# Patient Record
Sex: FEMALE | Race: WHITE | NOT HISPANIC OR LATINO | Employment: FULL TIME | URBAN - METROPOLITAN AREA
[De-identification: names, ages, dates, MRNs, and addresses within clinical notes are randomized per-mention and may not be internally consistent; named-entity substitution may affect disease eponyms.]

---

## 2019-01-15 ENCOUNTER — OFFICE VISIT CONVERTED (OUTPATIENT)
Dept: NEUROSURGERY | Facility: CLINIC | Age: 44
End: 2019-01-15
Attending: PHYSICIAN ASSISTANT

## 2019-01-23 ENCOUNTER — HOSPITAL ENCOUNTER (OUTPATIENT)
Dept: OTHER | Facility: HOSPITAL | Age: 44
Setting detail: RECURRING SERIES
Discharge: HOME | End: 2019-02-27

## 2019-01-23 ENCOUNTER — HOSPITAL ENCOUNTER (OUTPATIENT)
Dept: MRI IMAGING | Facility: HOSPITAL | Age: 44
Discharge: HOME | End: 2019-01-23
Attending: PHYSICIAN ASSISTANT

## 2019-01-31 ENCOUNTER — OFFICE VISIT CONVERTED (OUTPATIENT)
Dept: NEUROSURGERY | Facility: CLINIC | Age: 44
End: 2019-01-31
Attending: PHYSICIAN ASSISTANT

## 2019-02-08 ENCOUNTER — HOSPITAL ENCOUNTER (OUTPATIENT)
Dept: GENERAL RADIOLOGY | Facility: HOSPITAL | Age: 44
Discharge: HOME | End: 2019-02-08
Attending: PHYSICIAN ASSISTANT

## 2019-02-12 ENCOUNTER — OFFICE VISIT CONVERTED (OUTPATIENT)
Dept: FAMILY MEDICINE CLINIC | Facility: CLINIC | Age: 44
End: 2019-02-12
Attending: NURSE PRACTITIONER

## 2019-02-19 ENCOUNTER — OFFICE VISIT CONVERTED (OUTPATIENT)
Dept: NEUROSURGERY | Facility: CLINIC | Age: 44
End: 2019-02-19
Attending: PHYSICIAN ASSISTANT

## 2019-02-25 ENCOUNTER — OFFICE VISIT CONVERTED (OUTPATIENT)
Dept: FAMILY MEDICINE CLINIC | Facility: CLINIC | Age: 44
End: 2019-02-25
Attending: NURSE PRACTITIONER

## 2019-02-25 ENCOUNTER — HOSPITAL ENCOUNTER (OUTPATIENT)
Dept: FAMILY MEDICINE CLINIC | Facility: CLINIC | Age: 44
Discharge: HOME | End: 2019-02-25
Attending: NURSE PRACTITIONER

## 2019-02-25 ENCOUNTER — CONVERSION ENCOUNTER (OUTPATIENT)
Dept: FAMILY MEDICINE CLINIC | Facility: CLINIC | Age: 44
End: 2019-02-25

## 2019-02-25 LAB
ALBUMIN SERPL-MCNC: 4.3 G/DL (ref 3.5–5)
ALBUMIN/GLOB SERPL: 1.1 {RATIO} (ref 1.4–2.6)
ALP SERPL-CCNC: 97 U/L (ref 42–98)
ALT SERPL-CCNC: 18 U/L (ref 10–40)
ANION GAP SERPL CALC-SCNC: 20 MMOL/L (ref 8–19)
AST SERPL-CCNC: 19 U/L (ref 15–50)
BASOPHILS # BLD AUTO: 0.07 10*3/UL (ref 0–0.2)
BASOPHILS NFR BLD AUTO: 0.6 % (ref 0–3)
BILIRUB SERPL-MCNC: 0.22 MG/DL (ref 0.2–1.3)
BUN SERPL-MCNC: 17 MG/DL (ref 5–25)
BUN/CREAT SERPL: 20 {RATIO} (ref 6–20)
CALCIUM SERPL-MCNC: 9.4 MG/DL (ref 8.7–10.4)
CHLORIDE SERPL-SCNC: 104 MMOL/L (ref 99–111)
CHOLEST SERPL-MCNC: 176 MG/DL (ref 107–200)
CHOLEST/HDLC SERPL: 5.7 {RATIO} (ref 3–6)
CONV ABS IMM GRAN: 0.04 10*3/UL (ref 0–0.2)
CONV CO2: 19 MMOL/L (ref 22–32)
CONV CREATININE URINE, RANDOM: 393 MG/DL (ref 10–300)
CONV IMMATURE GRAN: 0.3 % (ref 0–1.8)
CONV MICROALBUM.,U,RANDOM: 21 MG/L (ref 0–20)
CONV TOTAL PROTEIN: 8.2 G/DL (ref 6.3–8.2)
CREAT UR-MCNC: 0.83 MG/DL (ref 0.5–0.9)
DEPRECATED RDW RBC AUTO: 44.5 FL (ref 36.4–46.3)
EOSINOPHIL # BLD AUTO: 0.4 10*3/UL (ref 0–0.7)
EOSINOPHIL # BLD AUTO: 3.1 % (ref 0–7)
ERYTHROCYTE [DISTWIDTH] IN BLOOD BY AUTOMATED COUNT: 12.8 % (ref 11.7–14.4)
EST. AVERAGE GLUCOSE BLD GHB EST-MCNC: 114 MG/DL
GFR SERPLBLD BASED ON 1.73 SQ M-ARVRAT: >60 ML/MIN/{1.73_M2}
GLOBULIN UR ELPH-MCNC: 3.9 G/DL (ref 2–3.5)
GLUCOSE SERPL-MCNC: 96 MG/DL (ref 65–99)
HBA1C MFR BLD: 14.3 G/DL (ref 12–16)
HBA1C MFR BLD: 5.6 % (ref 3.5–5.7)
HCT VFR BLD AUTO: 43.9 % (ref 37–47)
HDLC SERPL-MCNC: 31 MG/DL (ref 40–60)
LDLC SERPL CALC-MCNC: 82 MG/DL (ref 70–100)
LYMPHOCYTES # BLD AUTO: 3.65 10*3/UL (ref 1–5)
MCH RBC QN AUTO: 30.8 PG (ref 27–31)
MCHC RBC AUTO-ENTMCNC: 32.6 G/DL (ref 33–37)
MCV RBC AUTO: 94.6 FL (ref 81–99)
MICROALBUMIN/CREAT UR: 5.3 MG/G{CRE} (ref 0–35)
MONOCYTES # BLD AUTO: 0.87 10*3/UL (ref 0.2–1.2)
MONOCYTES NFR BLD AUTO: 6.8 % (ref 3–10)
NEUTROPHILS # BLD AUTO: 7.68 10*3/UL (ref 2–8)
NEUTROPHILS NFR BLD AUTO: 60.5 % (ref 30–85)
NRBC CBCN: 0 % (ref 0–0.7)
OSMOLALITY SERPL CALC.SUM OF ELEC: 289 MOSM/KG (ref 273–304)
PLATELET # BLD AUTO: 368 10*3/UL (ref 130–400)
PMV BLD AUTO: 11.3 FL (ref 9.4–12.3)
POTASSIUM SERPL-SCNC: 4.4 MMOL/L (ref 3.5–5.3)
RBC # BLD AUTO: 4.64 10*6/UL (ref 4.2–5.4)
SODIUM SERPL-SCNC: 139 MMOL/L (ref 135–147)
T4 FREE SERPL-MCNC: 1.5 NG/DL (ref 0.9–1.8)
TRIGL SERPL-MCNC: 314 MG/DL (ref 40–150)
TSH SERPL-ACNC: 1.45 M[IU]/L (ref 0.27–4.2)
VARIANT LYMPHS NFR BLD MANUAL: 28.7 % (ref 20–45)
VLDLC SERPL-MCNC: 63 MG/DL (ref 5–37)
WBC # BLD AUTO: 12.71 10*3/UL (ref 4.8–10.8)

## 2019-02-26 ENCOUNTER — HOSPITAL ENCOUNTER (OUTPATIENT)
Dept: ULTRASOUND IMAGING | Facility: HOSPITAL | Age: 44
Discharge: HOME | End: 2019-02-26
Attending: NURSE PRACTITIONER

## 2019-03-13 ENCOUNTER — OFFICE VISIT CONVERTED (OUTPATIENT)
Dept: FAMILY MEDICINE CLINIC | Facility: CLINIC | Age: 44
End: 2019-03-13
Attending: NURSE PRACTITIONER

## 2019-03-25 ENCOUNTER — OFFICE VISIT CONVERTED (OUTPATIENT)
Dept: UROLOGY | Facility: CLINIC | Age: 44
End: 2019-03-25
Attending: UROLOGY

## 2019-04-05 ENCOUNTER — CONVERSION ENCOUNTER (OUTPATIENT)
Dept: CARDIOLOGY | Facility: CLINIC | Age: 44
End: 2019-04-05

## 2019-04-12 ENCOUNTER — HOSPITAL ENCOUNTER (OUTPATIENT)
Dept: GENERAL RADIOLOGY | Facility: HOSPITAL | Age: 44
Discharge: HOME | End: 2019-04-12
Attending: UROLOGY

## 2019-04-22 ENCOUNTER — OFFICE VISIT CONVERTED (OUTPATIENT)
Dept: FAMILY MEDICINE CLINIC | Facility: CLINIC | Age: 44
End: 2019-04-22
Attending: NURSE PRACTITIONER

## 2019-05-09 ENCOUNTER — CONVERSION ENCOUNTER (OUTPATIENT)
Dept: NEUROLOGY | Facility: CLINIC | Age: 44
End: 2019-05-09

## 2019-05-09 ENCOUNTER — HOSPITAL ENCOUNTER (OUTPATIENT)
Dept: GENERAL RADIOLOGY | Facility: HOSPITAL | Age: 44
Discharge: HOME | End: 2019-05-09
Attending: NURSE PRACTITIONER

## 2019-05-09 ENCOUNTER — OFFICE VISIT CONVERTED (OUTPATIENT)
Dept: NEUROSURGERY | Facility: CLINIC | Age: 44
End: 2019-05-09
Attending: PHYSICIAN ASSISTANT

## 2019-05-10 ENCOUNTER — OFFICE VISIT CONVERTED (OUTPATIENT)
Dept: SURGERY | Facility: CLINIC | Age: 44
End: 2019-05-10
Attending: SURGERY

## 2019-05-16 ENCOUNTER — HOSPITAL ENCOUNTER (OUTPATIENT)
Dept: PERIOP | Facility: HOSPITAL | Age: 44
Setting detail: HOSPITAL OUTPATIENT SURGERY
Discharge: HOME | End: 2019-05-16
Attending: SURGERY

## 2019-05-16 LAB — HCG UR QL: NEGATIVE

## 2019-05-22 ENCOUNTER — CONVERSION ENCOUNTER (OUTPATIENT)
Dept: OTOLARYNGOLOGY | Facility: CLINIC | Age: 44
End: 2019-05-22

## 2019-05-22 ENCOUNTER — OFFICE VISIT CONVERTED (OUTPATIENT)
Dept: OTOLARYNGOLOGY | Facility: CLINIC | Age: 44
End: 2019-05-22
Attending: OTOLARYNGOLOGY

## 2019-05-28 ENCOUNTER — CONVERSION ENCOUNTER (OUTPATIENT)
Dept: CARDIOLOGY | Facility: CLINIC | Age: 44
End: 2019-05-28
Attending: SPECIALIST

## 2019-05-31 ENCOUNTER — OFFICE VISIT CONVERTED (OUTPATIENT)
Dept: SURGERY | Facility: CLINIC | Age: 44
End: 2019-05-31
Attending: SURGERY

## 2019-06-17 ENCOUNTER — HOSPITAL ENCOUNTER (OUTPATIENT)
Dept: FAMILY MEDICINE CLINIC | Facility: CLINIC | Age: 44
Discharge: HOME | End: 2019-06-17
Attending: NURSE PRACTITIONER

## 2019-06-17 ENCOUNTER — OFFICE VISIT CONVERTED (OUTPATIENT)
Dept: FAMILY MEDICINE CLINIC | Facility: CLINIC | Age: 44
End: 2019-06-17
Attending: NURSE PRACTITIONER

## 2019-06-17 LAB
ALBUMIN SERPL-MCNC: 4.1 G/DL (ref 3.5–5)
ALBUMIN/GLOB SERPL: 1.2 {RATIO} (ref 1.4–2.6)
ALP SERPL-CCNC: 101 U/L (ref 42–98)
ALT SERPL-CCNC: 10 U/L (ref 10–40)
ANION GAP SERPL CALC-SCNC: 18 MMOL/L (ref 8–19)
AST SERPL-CCNC: 12 U/L (ref 15–50)
BILIRUB SERPL-MCNC: 0.29 MG/DL (ref 0.2–1.3)
BUN SERPL-MCNC: 15 MG/DL (ref 5–25)
BUN/CREAT SERPL: 22 {RATIO} (ref 6–20)
CALCIUM SERPL-MCNC: 9.1 MG/DL (ref 8.7–10.4)
CHLORIDE SERPL-SCNC: 106 MMOL/L (ref 99–111)
CHOLEST SERPL-MCNC: 149 MG/DL (ref 107–200)
CHOLEST/HDLC SERPL: 5.1 {RATIO} (ref 3–6)
CONV CO2: 19 MMOL/L (ref 22–32)
CONV TOTAL PROTEIN: 7.6 G/DL (ref 6.3–8.2)
CREAT UR-MCNC: 0.69 MG/DL (ref 0.5–0.9)
EST. AVERAGE GLUCOSE BLD GHB EST-MCNC: 111 MG/DL
GFR SERPLBLD BASED ON 1.73 SQ M-ARVRAT: >60 ML/MIN/{1.73_M2}
GLOBULIN UR ELPH-MCNC: 3.5 G/DL (ref 2–3.5)
GLUCOSE SERPL-MCNC: 95 MG/DL (ref 65–99)
HBA1C MFR BLD: 5.5 % (ref 3.5–5.7)
HDLC SERPL-MCNC: 29 MG/DL (ref 40–60)
LDLC SERPL CALC-MCNC: 84 MG/DL (ref 70–100)
OSMOLALITY SERPL CALC.SUM OF ELEC: 289 MOSM/KG (ref 273–304)
POTASSIUM SERPL-SCNC: 4 MMOL/L (ref 3.5–5.3)
SODIUM SERPL-SCNC: 139 MMOL/L (ref 135–147)
TRIGL SERPL-MCNC: 182 MG/DL (ref 40–150)
VLDLC SERPL-MCNC: 36 MG/DL (ref 5–37)

## 2019-09-11 ENCOUNTER — OFFICE VISIT CONVERTED (OUTPATIENT)
Dept: FAMILY MEDICINE CLINIC | Facility: CLINIC | Age: 44
End: 2019-09-11
Attending: FAMILY MEDICINE

## 2019-09-17 ENCOUNTER — OFFICE VISIT CONVERTED (OUTPATIENT)
Dept: FAMILY MEDICINE CLINIC | Facility: CLINIC | Age: 44
End: 2019-09-17
Attending: NURSE PRACTITIONER

## 2019-09-17 ENCOUNTER — HOSPITAL ENCOUNTER (OUTPATIENT)
Dept: FAMILY MEDICINE CLINIC | Facility: CLINIC | Age: 44
Discharge: HOME | End: 2019-09-17
Attending: NURSE PRACTITIONER

## 2019-09-19 LAB — BACTERIA UR CULT: NORMAL

## 2020-03-23 ENCOUNTER — HOSPITAL ENCOUNTER (OUTPATIENT)
Dept: FAMILY MEDICINE CLINIC | Facility: CLINIC | Age: 45
Discharge: HOME | End: 2020-03-23
Attending: NURSE PRACTITIONER

## 2020-03-23 ENCOUNTER — OFFICE VISIT CONVERTED (OUTPATIENT)
Dept: FAMILY MEDICINE CLINIC | Facility: CLINIC | Age: 45
End: 2020-03-23
Attending: NURSE PRACTITIONER

## 2020-03-23 LAB
ALBUMIN SERPL-MCNC: 4.1 G/DL (ref 3.5–5)
ALBUMIN/GLOB SERPL: 1.3 {RATIO} (ref 1.4–2.6)
ALP SERPL-CCNC: 113 U/L (ref 42–98)
ALT SERPL-CCNC: 25 U/L (ref 10–40)
ANION GAP SERPL CALC-SCNC: 18 MMOL/L (ref 8–19)
AST SERPL-CCNC: 22 U/L (ref 15–50)
BASOPHILS # BLD AUTO: 0.06 10*3/UL (ref 0–0.2)
BASOPHILS NFR BLD AUTO: 0.5 % (ref 0–3)
BILIRUB SERPL-MCNC: <0.15 MG/DL (ref 0.2–1.3)
BUN SERPL-MCNC: 17 MG/DL (ref 5–25)
BUN/CREAT SERPL: 24 {RATIO} (ref 6–20)
CALCIUM SERPL-MCNC: 9 MG/DL (ref 8.7–10.4)
CHLORIDE SERPL-SCNC: 103 MMOL/L (ref 99–111)
CHOLEST SERPL-MCNC: 133 MG/DL (ref 107–200)
CHOLEST/HDLC SERPL: 3.7 {RATIO} (ref 3–6)
CONV ABS IMM GRAN: 0.05 10*3/UL (ref 0–0.2)
CONV CO2: 22 MMOL/L (ref 22–32)
CONV IMMATURE GRAN: 0.4 % (ref 0–1.8)
CONV TOTAL PROTEIN: 7.3 G/DL (ref 6.3–8.2)
CREAT UR-MCNC: 0.7 MG/DL (ref 0.5–0.9)
DEPRECATED RDW RBC AUTO: 49.4 FL (ref 36.4–46.3)
EOSINOPHIL # BLD AUTO: 0.15 10*3/UL (ref 0–0.7)
EOSINOPHIL # BLD AUTO: 1.3 % (ref 0–7)
ERYTHROCYTE [DISTWIDTH] IN BLOOD BY AUTOMATED COUNT: 13.6 % (ref 11.7–14.4)
EST. AVERAGE GLUCOSE BLD GHB EST-MCNC: 126 MG/DL
GFR SERPLBLD BASED ON 1.73 SQ M-ARVRAT: >60 ML/MIN/{1.73_M2}
GLOBULIN UR ELPH-MCNC: 3.2 G/DL (ref 2–3.5)
GLUCOSE SERPL-MCNC: 97 MG/DL (ref 65–99)
HBA1C MFR BLD: 6 % (ref 3.5–5.7)
HCT VFR BLD AUTO: 43.5 % (ref 37–47)
HDLC SERPL-MCNC: 36 MG/DL (ref 40–60)
HGB BLD-MCNC: 13.8 G/DL (ref 12–16)
LDLC SERPL CALC-MCNC: 76 MG/DL (ref 70–100)
LYMPHOCYTES # BLD AUTO: 2.53 10*3/UL (ref 1–5)
LYMPHOCYTES NFR BLD AUTO: 22.7 % (ref 20–45)
MCH RBC QN AUTO: 31.2 PG (ref 27–31)
MCHC RBC AUTO-ENTMCNC: 31.7 G/DL (ref 33–37)
MCV RBC AUTO: 98.2 FL (ref 81–99)
MONOCYTES # BLD AUTO: 0.67 10*3/UL (ref 0.2–1.2)
MONOCYTES NFR BLD AUTO: 6 % (ref 3–10)
NEUTROPHILS # BLD AUTO: 7.69 10*3/UL (ref 2–8)
NEUTROPHILS NFR BLD AUTO: 69.1 % (ref 30–85)
NRBC CBCN: 0 % (ref 0–0.7)
OSMOLALITY SERPL CALC.SUM OF ELEC: 289 MOSM/KG (ref 273–304)
PLATELET # BLD AUTO: 319 10*3/UL (ref 130–400)
PMV BLD AUTO: 11.3 FL (ref 9.4–12.3)
POTASSIUM SERPL-SCNC: 3.7 MMOL/L (ref 3.5–5.3)
RBC # BLD AUTO: 4.43 10*6/UL (ref 4.2–5.4)
SODIUM SERPL-SCNC: 139 MMOL/L (ref 135–147)
TRIGL SERPL-MCNC: 103 MG/DL (ref 40–150)
VLDLC SERPL-MCNC: 21 MG/DL (ref 5–37)
WBC # BLD AUTO: 11.15 10*3/UL (ref 4.8–10.8)

## 2020-06-10 ENCOUNTER — HOSPITAL ENCOUNTER (OUTPATIENT)
Dept: FAMILY MEDICINE CLINIC | Facility: CLINIC | Age: 45
Discharge: HOME | End: 2020-06-10
Attending: NURSE PRACTITIONER

## 2020-06-10 ENCOUNTER — OFFICE VISIT CONVERTED (OUTPATIENT)
Dept: FAMILY MEDICINE CLINIC | Facility: CLINIC | Age: 45
End: 2020-06-10
Attending: NURSE PRACTITIONER

## 2020-06-12 LAB
BACTERIA SPEC AEROBE CULT: ABNORMAL
BACTERIA SPEC AEROBE CULT: ABNORMAL
CIPROFLOXACIN SUSC ISLT: >=8
CIPROFLOXACIN SUSC ISLT: >=8
CLINDAMYCIN SUSC ISLT: 0.25
CLINDAMYCIN SUSC ISLT: 0.25
DAPTOMYCIN SUSC ISLT: 0.5
DAPTOMYCIN SUSC ISLT: 1
DOXYCYCLINE SUSC ISLT: <=0.5
DOXYCYCLINE SUSC ISLT: <=0.5
ERYTHROMYCIN SUSC ISLT: >=8
ERYTHROMYCIN SUSC ISLT: >=8
GENTAMICIN SUSC ISLT: <=0.5
GENTAMICIN SUSC ISLT: <=0.5
LEVOFLOXACIN SUSC ISLT: 4
LEVOFLOXACIN SUSC ISLT: 4
OXACILLIN SUSC ISLT: 0.5
OXACILLIN SUSC ISLT: 0.5
RIFAMPIN SUSC ISLT: <=0.5
RIFAMPIN SUSC ISLT: <=0.5
TETRACYCLINE SUSC ISLT: <=1
TETRACYCLINE SUSC ISLT: <=1
TIGECYCLINE SUSC ISLT: <=0.12
TIGECYCLINE SUSC ISLT: <=0.12
TMP SMX SUSC ISLT: <=10
TMP SMX SUSC ISLT: <=10
VANCOMYCIN SUSC ISLT: 1
VANCOMYCIN SUSC ISLT: 1

## 2020-06-26 ENCOUNTER — OFFICE VISIT CONVERTED (OUTPATIENT)
Dept: FAMILY MEDICINE CLINIC | Facility: CLINIC | Age: 45
End: 2020-06-26
Attending: NURSE PRACTITIONER

## 2020-06-26 ENCOUNTER — OFFICE VISIT CONVERTED (OUTPATIENT)
Dept: SURGERY | Facility: CLINIC | Age: 45
End: 2020-06-26
Attending: SURGERY

## 2020-06-26 ENCOUNTER — HOSPITAL ENCOUNTER (OUTPATIENT)
Dept: FAMILY MEDICINE CLINIC | Facility: CLINIC | Age: 45
Discharge: HOME | End: 2020-06-26
Attending: NURSE PRACTITIONER

## 2020-06-26 LAB
ALBUMIN SERPL-MCNC: 4.2 G/DL (ref 3.5–5)
ALBUMIN/GLOB SERPL: 1.3 {RATIO} (ref 1.4–2.6)
ALP SERPL-CCNC: 125 U/L (ref 42–98)
ALT SERPL-CCNC: 20 U/L (ref 10–40)
ANION GAP SERPL CALC-SCNC: 15 MMOL/L (ref 8–19)
AST SERPL-CCNC: 22 U/L (ref 15–50)
BASOPHILS # BLD AUTO: 0.05 10*3/UL (ref 0–0.2)
BASOPHILS NFR BLD AUTO: 0.4 % (ref 0–3)
BILIRUB SERPL-MCNC: 0.17 MG/DL (ref 0.2–1.3)
BUN SERPL-MCNC: 18 MG/DL (ref 5–25)
BUN/CREAT SERPL: 25 {RATIO} (ref 6–20)
CALCIUM SERPL-MCNC: 9.4 MG/DL (ref 8.7–10.4)
CHLORIDE SERPL-SCNC: 105 MMOL/L (ref 99–111)
CONV ABS IMM GRAN: 0.04 10*3/UL (ref 0–0.2)
CONV CO2: 20 MMOL/L (ref 22–32)
CONV IMMATURE GRAN: 0.3 % (ref 0–1.8)
CONV TOTAL PROTEIN: 7.4 G/DL (ref 6.3–8.2)
CREAT UR-MCNC: 0.71 MG/DL (ref 0.5–0.9)
DEPRECATED RDW RBC AUTO: 46.6 FL (ref 36.4–46.3)
EOSINOPHIL # BLD AUTO: 0.32 10*3/UL (ref 0–0.7)
EOSINOPHIL # BLD AUTO: 2.6 % (ref 0–7)
ERYTHROCYTE [DISTWIDTH] IN BLOOD BY AUTOMATED COUNT: 13.1 % (ref 11.7–14.4)
EST. AVERAGE GLUCOSE BLD GHB EST-MCNC: 131 MG/DL
GFR SERPLBLD BASED ON 1.73 SQ M-ARVRAT: >60 ML/MIN/{1.73_M2}
GLOBULIN UR ELPH-MCNC: 3.2 G/DL (ref 2–3.5)
GLUCOSE SERPL-MCNC: 126 MG/DL (ref 65–99)
HBA1C MFR BLD: 6.2 % (ref 3.5–5.7)
HCT VFR BLD AUTO: 43.2 % (ref 37–47)
HGB BLD-MCNC: 13.8 G/DL (ref 12–16)
LYMPHOCYTES # BLD AUTO: 2.55 10*3/UL (ref 1–5)
LYMPHOCYTES NFR BLD AUTO: 20.5 % (ref 20–45)
MCH RBC QN AUTO: 30.6 PG (ref 27–31)
MCHC RBC AUTO-ENTMCNC: 31.9 G/DL (ref 33–37)
MCV RBC AUTO: 95.8 FL (ref 81–99)
MONOCYTES # BLD AUTO: 0.81 10*3/UL (ref 0.2–1.2)
MONOCYTES NFR BLD AUTO: 6.5 % (ref 3–10)
NEUTROPHILS # BLD AUTO: 8.64 10*3/UL (ref 2–8)
NEUTROPHILS NFR BLD AUTO: 69.7 % (ref 30–85)
NRBC CBCN: 0 % (ref 0–0.7)
OSMOLALITY SERPL CALC.SUM OF ELEC: 285 MOSM/KG (ref 273–304)
PLATELET # BLD AUTO: 277 10*3/UL (ref 130–400)
PMV BLD AUTO: 11.8 FL (ref 9.4–12.3)
POTASSIUM SERPL-SCNC: 3.9 MMOL/L (ref 3.5–5.3)
RBC # BLD AUTO: 4.51 10*6/UL (ref 4.2–5.4)
SODIUM SERPL-SCNC: 136 MMOL/L (ref 135–147)
T4 FREE SERPL-MCNC: 1.2 NG/DL (ref 0.9–1.8)
TSH SERPL-ACNC: 0.99 M[IU]/L (ref 0.27–4.2)
WBC # BLD AUTO: 12.41 10*3/UL (ref 4.8–10.8)

## 2020-08-14 ENCOUNTER — HOSPITAL ENCOUNTER (OUTPATIENT)
Dept: GENERAL RADIOLOGY | Facility: HOSPITAL | Age: 45
Discharge: HOME | End: 2020-08-14
Attending: NURSE PRACTITIONER

## 2020-09-01 ENCOUNTER — HOSPITAL ENCOUNTER (OUTPATIENT)
Dept: FAMILY MEDICINE CLINIC | Facility: CLINIC | Age: 45
Discharge: HOME | End: 2020-09-01
Attending: NURSE PRACTITIONER

## 2020-09-01 ENCOUNTER — OFFICE VISIT CONVERTED (OUTPATIENT)
Dept: FAMILY MEDICINE CLINIC | Facility: CLINIC | Age: 45
End: 2020-09-01
Attending: NURSE PRACTITIONER

## 2020-09-01 LAB
ALBUMIN SERPL-MCNC: 4 G/DL (ref 3.5–5)
ALBUMIN/GLOB SERPL: 1.3 {RATIO} (ref 1.4–2.6)
ALP SERPL-CCNC: 129 U/L (ref 42–98)
ALT SERPL-CCNC: 21 U/L (ref 10–40)
ANION GAP SERPL CALC-SCNC: 18 MMOL/L (ref 8–19)
AST SERPL-CCNC: 23 U/L (ref 15–50)
BASOPHILS # BLD AUTO: 0.05 10*3/UL (ref 0–0.2)
BASOPHILS NFR BLD AUTO: 0.4 % (ref 0–3)
BILIRUB SERPL-MCNC: 0.16 MG/DL (ref 0.2–1.3)
BUN SERPL-MCNC: 21 MG/DL (ref 5–25)
BUN/CREAT SERPL: 25 {RATIO} (ref 6–20)
CALCIUM SERPL-MCNC: 9.5 MG/DL (ref 8.7–10.4)
CHLORIDE SERPL-SCNC: 105 MMOL/L (ref 99–111)
CONV ABS IMM GRAN: 0.05 10*3/UL (ref 0–0.2)
CONV CO2: 19 MMOL/L (ref 22–32)
CONV IMMATURE GRAN: 0.4 % (ref 0–1.8)
CONV TOTAL PROTEIN: 7.2 G/DL (ref 6.3–8.2)
CREAT UR-MCNC: 0.84 MG/DL (ref 0.5–0.9)
DEPRECATED RDW RBC AUTO: 48.1 FL (ref 36.4–46.3)
EOSINOPHIL # BLD AUTO: 0.43 10*3/UL (ref 0–0.7)
EOSINOPHIL # BLD AUTO: 3.4 % (ref 0–7)
ERYTHROCYTE [DISTWIDTH] IN BLOOD BY AUTOMATED COUNT: 13.7 % (ref 11.7–14.4)
EST. AVERAGE GLUCOSE BLD GHB EST-MCNC: 120 MG/DL
GFR SERPLBLD BASED ON 1.73 SQ M-ARVRAT: >60 ML/MIN/{1.73_M2}
GLOBULIN UR ELPH-MCNC: 3.2 G/DL (ref 2–3.5)
GLUCOSE SERPL-MCNC: 119 MG/DL (ref 65–99)
HBA1C MFR BLD: 5.8 % (ref 3.5–5.7)
HCT VFR BLD AUTO: 42.2 % (ref 37–47)
HGB BLD-MCNC: 13.8 G/DL (ref 12–16)
LYMPHOCYTES # BLD AUTO: 2.73 10*3/UL (ref 1–5)
LYMPHOCYTES NFR BLD AUTO: 21.5 % (ref 20–45)
MCH RBC QN AUTO: 31.1 PG (ref 27–31)
MCHC RBC AUTO-ENTMCNC: 32.7 G/DL (ref 33–37)
MCV RBC AUTO: 95 FL (ref 81–99)
MONOCYTES # BLD AUTO: 0.83 10*3/UL (ref 0.2–1.2)
MONOCYTES NFR BLD AUTO: 6.5 % (ref 3–10)
NEUTROPHILS # BLD AUTO: 8.59 10*3/UL (ref 2–8)
NEUTROPHILS NFR BLD AUTO: 67.8 % (ref 30–85)
NRBC CBCN: 0 % (ref 0–0.7)
OSMOLALITY SERPL CALC.SUM OF ELEC: 290 MOSM/KG (ref 273–304)
PLATELET # BLD AUTO: 303 10*3/UL (ref 130–400)
PMV BLD AUTO: 11.3 FL (ref 9.4–12.3)
POTASSIUM SERPL-SCNC: 4 MMOL/L (ref 3.5–5.3)
RBC # BLD AUTO: 4.44 10*6/UL (ref 4.2–5.4)
SODIUM SERPL-SCNC: 138 MMOL/L (ref 135–147)
WBC # BLD AUTO: 12.68 10*3/UL (ref 4.8–10.8)

## 2020-09-02 LAB
FOLATE SERPL-MCNC: 6.5 NG/ML (ref 4.8–20)
T4 FREE SERPL-MCNC: 1.6 NG/DL (ref 0.9–1.8)
TSH SERPL-ACNC: 1.8 M[IU]/L (ref 0.27–4.2)
VIT B12 SERPL-MCNC: 1913 PG/ML (ref 211–911)

## 2020-12-01 ENCOUNTER — TELEMEDICINE CONVERTED (OUTPATIENT)
Dept: FAMILY MEDICINE CLINIC | Facility: CLINIC | Age: 45
End: 2020-12-01
Attending: NURSE PRACTITIONER

## 2021-01-05 ENCOUNTER — TELEMEDICINE CONVERTED (OUTPATIENT)
Dept: FAMILY MEDICINE CLINIC | Facility: CLINIC | Age: 46
End: 2021-01-05
Attending: NURSE PRACTITIONER

## 2021-04-05 ENCOUNTER — OFFICE VISIT CONVERTED (OUTPATIENT)
Dept: FAMILY MEDICINE CLINIC | Facility: CLINIC | Age: 46
End: 2021-04-05
Attending: NURSE PRACTITIONER

## 2021-04-05 ENCOUNTER — HOSPITAL ENCOUNTER (OUTPATIENT)
Dept: FAMILY MEDICINE CLINIC | Facility: CLINIC | Age: 46
Discharge: HOME | End: 2021-04-05
Attending: NURSE PRACTITIONER

## 2021-04-05 LAB
ALBUMIN SERPL-MCNC: 4.1 G/DL (ref 3.5–5)
ALBUMIN/GLOB SERPL: 1.3 {RATIO} (ref 1.4–2.6)
ALP SERPL-CCNC: 109 U/L (ref 42–98)
ALT SERPL-CCNC: 19 U/L (ref 10–40)
ANION GAP SERPL CALC-SCNC: 15 MMOL/L (ref 8–19)
AST SERPL-CCNC: 22 U/L (ref 15–50)
BASOPHILS # BLD AUTO: 0.03 10*3/UL (ref 0–0.2)
BASOPHILS NFR BLD AUTO: 0.3 % (ref 0–3)
BILIRUB SERPL-MCNC: 0.27 MG/DL (ref 0.2–1.3)
BUN SERPL-MCNC: 17 MG/DL (ref 5–25)
BUN/CREAT SERPL: 23 {RATIO} (ref 6–20)
CALCIUM SERPL-MCNC: 9.3 MG/DL (ref 8.7–10.4)
CHLORIDE SERPL-SCNC: 102 MMOL/L (ref 99–111)
CHOLEST SERPL-MCNC: 152 MG/DL (ref 107–200)
CHOLEST/HDLC SERPL: 4.8 {RATIO} (ref 3–6)
CONV ABS IMM GRAN: 0.03 10*3/UL (ref 0–0.2)
CONV CO2: 23 MMOL/L (ref 22–32)
CONV IMMATURE GRAN: 0.3 % (ref 0–1.8)
CONV TOTAL PROTEIN: 7.3 G/DL (ref 6.3–8.2)
CREAT UR-MCNC: 0.75 MG/DL (ref 0.5–0.9)
DEPRECATED RDW RBC AUTO: 48.8 FL (ref 36.4–46.3)
EOSINOPHIL # BLD AUTO: 0.29 10*3/UL (ref 0–0.7)
EOSINOPHIL # BLD AUTO: 2.9 % (ref 0–7)
ERYTHROCYTE [DISTWIDTH] IN BLOOD BY AUTOMATED COUNT: 13.4 % (ref 11.7–14.4)
EST. AVERAGE GLUCOSE BLD GHB EST-MCNC: 117 MG/DL
GFR SERPLBLD BASED ON 1.73 SQ M-ARVRAT: >60 ML/MIN/{1.73_M2}
GLOBULIN UR ELPH-MCNC: 3.2 G/DL (ref 2–3.5)
GLUCOSE SERPL-MCNC: 89 MG/DL (ref 65–99)
HBA1C MFR BLD: 5.7 % (ref 3.5–5.7)
HCT VFR BLD AUTO: 43.1 % (ref 37–47)
HDLC SERPL-MCNC: 32 MG/DL (ref 40–60)
HGB BLD-MCNC: 13.8 G/DL (ref 12–16)
LDLC SERPL CALC-MCNC: 75 MG/DL (ref 70–100)
LYMPHOCYTES # BLD AUTO: 3.1 10*3/UL (ref 1–5)
LYMPHOCYTES NFR BLD AUTO: 31.1 % (ref 20–45)
MCH RBC QN AUTO: 31.3 PG (ref 27–31)
MCHC RBC AUTO-ENTMCNC: 32 G/DL (ref 33–37)
MCV RBC AUTO: 97.7 FL (ref 81–99)
MONOCYTES # BLD AUTO: 0.77 10*3/UL (ref 0.2–1.2)
MONOCYTES NFR BLD AUTO: 7.7 % (ref 3–10)
NEUTROPHILS # BLD AUTO: 5.74 10*3/UL (ref 2–8)
NEUTROPHILS NFR BLD AUTO: 57.7 % (ref 30–85)
NRBC CBCN: 0 % (ref 0–0.7)
OSMOLALITY SERPL CALC.SUM OF ELEC: 283 MOSM/KG (ref 273–304)
PLATELET # BLD AUTO: 283 10*3/UL (ref 130–400)
PMV BLD AUTO: 11.2 FL (ref 9.4–12.3)
POTASSIUM SERPL-SCNC: 4.1 MMOL/L (ref 3.5–5.3)
RBC # BLD AUTO: 4.41 10*6/UL (ref 4.2–5.4)
SODIUM SERPL-SCNC: 136 MMOL/L (ref 135–147)
TRIGL SERPL-MCNC: 226 MG/DL (ref 40–150)
VLDLC SERPL-MCNC: 45 MG/DL (ref 5–37)
WBC # BLD AUTO: 9.96 10*3/UL (ref 4.8–10.8)

## 2021-04-21 ENCOUNTER — OFFICE VISIT CONVERTED (OUTPATIENT)
Dept: PODIATRY | Facility: CLINIC | Age: 46
End: 2021-04-21
Attending: PODIATRIST

## 2021-05-10 NOTE — H&P
History and Physical      Patient Name: Debi Schroeder   Patient ID: [id number]   Sex: Female   YOB: 1975    Primary Care Provider: Sarah RYAN   Referring Provider: Sarah RYAN    Visit Date: June 26, 2020    Provider: Saad Downey MD   Location: [facility]   Location Address: [address] [place] [address]  [id number]   Location Phone: [phone]          History Of Present Illness     Debi came in today for evaluation. She is a nice lady that we have seen in the past. She presents with a recent infection of the left axilla. Apparently, she has had trouble with right axillary abscesses in the past and developed some new areas of inflammation in the left axilla. It is better now since getting started on some antibiotics.            Past Medical History  Abdominal pain; Allergic rhinitis due to allergen; Arthritis; Bladder problem; COPD; Degenerative Disc Disease ; Depression with anxiety; Diabetes mellitus type 2, noninsulin dependent; Essential hypertension; Herniated Disc; Hidradenitis suppurativa of left axilla; HPV (human papilloma virus) infection; Hyperlipidemia; Hypertension, Benign Essential; Impaired fasting glucose; Leg pain; Leg swelling; Migraines; Neuropathy; OAB (overactive bladder); Pain management; PTSD (post-traumatic stress disorder); Spinal stenosis; Thyroid nodule         Past Surgical History  Bladder Surgery; Cholecstectomy; Kidney Surgery         Medication List  albuterol sulfate 90 mcg/actuation inhalation HFA aerosol inhaler; cetirizine 10 mg oral tablet; Detrol 1 mg oral tablet; doxycycline hyclate 100 mg oral capsule; Effexor XR 37.5 mg oral capsule,extended release 24hr; fexofenadine 180 mg oral tablet; hydroxyzine HCl 10 mg oral tablet; oxycodone 10 mg oral tablet; propranolol 40 mg oral tablet; tizanidine 4 mg oral tablet         Allergy List  PENICILLINS         Family Medical History  Family history of Arthritis; Family  "history of heart disease; Family history of diabetes mellitus         Reproductive History   0 Para 0 0 0 0       Social History  Alcohol (Never); Tobacco (Current every day); Unemployed         Immunizations  Name Date Admin   Influenza          Vitals  Date Time BP Position Site L\R Cuff Size HR RR TEMP (F) WT  HT  BMI kg/m2 BSA m2 O2 Sat HC       2020 10:11 AM       16  266lbs 0oz 5'  7\" 41.66 2.39           Physical Examination     She has no active abscesses on either side but she does have stigmata of hidradenitis in both axilla.           Assessment  · Hidradenitis suppurativa     705.83/L73.2      Plan  · Medications  o Medications have been Reconciled  o Transition of Care or Provider Policy  · Instructions  o Follow Up PRN     At this point, I told Debi there is nothing that needs to be acutely drained and actually her infection seems to be pretty well controlled right now. We did talk about potentially, at some point, considering a definitive axillary excision if her symptoms were to get worse and she indicated that she would go home and think about this. I will see her back on an as needed basis.             Electronically Signed by: Violet Pickett-, -Author on 2020 11:48:41 AM  Electronically Co-signed by: Saad Downey MD -Reviewer on 2020 07:23:51 AM  "

## 2021-05-11 NOTE — H&P
History and Physical      Patient Name: Debi Schroeder   Patient ID: [id number]   Sex: Female   YOB: 1975    Primary Care Provider: Sarah RYAN   Referring Provider: Sarah RYAN    Visit Date: April 21, 2021    Provider: Alexei Quinn DPM   Location: [facility]   Location Address: [address] [place] [address]  [id number]   Location Phone: [phone]          Chief Complaint  · Routine Foot Care Visit      History Of Present Illness  Debi Schroeder complains of painful, elongated toenails which are thickened, yellowed, chalky, and cause pain with shoe gear and ambulation.      New, Established, New Problem:  New  Location:  Toenails  Duration:  Greater than five years  Onset:  Gradual  Nature:  sore with palpation.  Stable, worsening, improving:   Worsening  Aggravating factors:  Pain with shoe gear and ambulation.  Previous Treatment:  Pt cannot trim their own toenails    Patient denies any fevers, chills, nausea, vomiting, shortness of breathe, nor any other constitutional signs nor symptoms.    Patient states that they work at [employer].    Pt states she goes to pain t for DJD in her back.       Past Medical History  Abdominal pain; Allergic rhinitis due to allergen; Arthritis; Bladder problem; COPD; Degenerative Disc Disease ; Depression with anxiety; Dysmenorrhea; Essential hypertension; Herniated Disc; Hidradenitis suppurativa; Hidradenitis suppurativa of left axilla; HPV (human papilloma virus) infection; Hyperlipidemia; Hypertension, Benign Essential; IBS (irritable bowel syndrome); Impaired fasting glucose; Ingrown toenail; Leg pain; Leg swelling; Migraines; Neuropathy; Numbness in feet; OAB (overactive bladder); Pain management; PTSD (post-traumatic stress disorder); Spinal stenosis; Thyroid nodule         Past Surgical History  Bladder Surgery; Cholecstectomy; Kidney Surgery         Medication List  albuterol sulfate 90  "mcg/actuation inhalation HFA aerosol inhaler; cetirizine 10 mg oral tablet; FEXOFENADINE 180MG TABLETS (OTC); fluticasone propionate 50 mcg/actuation nasal spray,suspension; methocarbamol 750 mg oral tablet; Myrbetriq 50 mg oral tablet extended release 24 hr; Percocet 7.5-325 mg oral tablet; propranolol 40 mg oral tablet; rizatriptan 10 mg oral tablet,disintegrating; tizanidine 4 mg oral tablet         Allergy List  oxybutynin; PENICILLINS       Allergies Reconciled  Family Medical History  Family history of Arthritis; Family history of heart disease; Family history of diabetes mellitus         Reproductive History   0 Para 0 0 0 0       Social History  Alcohol (Never); Tobacco (Current every day); Unemployed         Immunizations  Name Date Admin   Influenza 02/15/2019         Review of Systems  · Constitutional  o Denies  o : fatigue, night sweats  · Eyes  o Denies  o : double vision, blurred vision  · HENT  o Denies  o : vertigo, recent head injury  · Cardiovascular  o Denies  o : chest pain, irregular heart beats  · Respiratory  o Denies  o : shortness of breath, productive cough  · Gastrointestinal  o Denies  o : nausea, vomiting  · Genitourinary  o Denies  o : dysuria, urinary retention  · Integument  o * See HPI  · Neurologic  o Denies  o : altered mental status, seizures  · Musculoskeletal  o Denies  o : joint swelling, limitation of motion  · Endocrine  o Denies  o : cold intolerance, heat intolerance  · Heme-Lymph  o Denies  o : petechiae, lymph node enlargement or tenderness  · Allergic-Immunologic  o Denies  o : frequent illnesses      Vitals  Date Time BP Position Site L\R Cuff Size HR RR TEMP (F) WT  HT  BMI kg/m2 BSA m2 O2 Sat FR L/min FiO2        2021 02:52 /67 Sitting    83 - R  97.3 171lbs 0oz 5'  7\" 26.78 1.91 97 %            Physical Examination  · Constitutional  o Appearance  o : No acute distress, generally in good health. Awake, alert, understands questions and responds " appropriately.   · Respiratory  o Respiratory Effort  o : No labored breathing. Good respiratory effort.   · Cardiovascular  o Peripheral Vascular System  o :   § Pedal Pulses  § : Pedal Pulses are 2+ and symmetrical  § Extremities  § : There is no edema of the lower extremities  · Musculoskeletal  o Extremeties/Joint  o : Lower extremity muscle strength and range of motion is equal and symmetrical bilaterally. The knees are noted to be in normal alignment. Ankle alignment and range of motion is normal and foot structure is normal.  · Neurologic  o Sensation  o : Sharp/dull sensation is within normal limits bilaterally. Monofilament sensation examination of the left foot is normal. Monofilament sensation examination of the right foot is normal.  · Toes  o Toes: Right Foot  o :   § Toenails  § : Toenails are hypertrophic, mycotic, dystrophic, brittle toenail(s) at nail 1, 4, 5 with onycholysis of the right foot. The 1st, 4th, 5th toenail(s) on the right have 2 mm in thickness with subungual detritus. There is an incurvated toenail at the distal border of the 1st, 4th, 5th toe  o Toes: Left Foot  o :   § Toenails  § : There are hypertrophic, mycotic, dystrophic, brittle toenail(s) at the 1, 4, 5 with onycholysis of the left foot. The 1st, 4th, 5th toenail(s) on the left have 2 mm in thickness with subungual detritus. There is an incurvated toenail at the distal border of the 1st, 4th, 5th toe  · Procedures  o Nail Debridement  o : Nail debridement is indicated for the following toenails: left hallux, left 4th toe, left 5th toe, right hallux, right 4th toe, right 5th toe. The nail was debrided of excessive thickness to appropriate levels of comfort and contour using, nail nippers. The procedure was without complications          Assessment  · Foot pain, bilateral       Pain in right foot     729.5/M79.671  Pain in left foot     729.5/M79.672  · Ingrowing nail     703.0/L60.0  · Tinea  marileeuium     110.1/B35.1      Plan  · Orders  o Debridement of six or more nails (34502) - - 04/21/2021  · Medications  o Medications have been Reconciled  o Transition of Care or Provider Policy  · Instructions  o Follow Up in 9 weeks  o I have discussed the findings of this evaluation with the patient. The discussion included a complete verbal explanation of any changes in the examination results, diagnosis, and the current treatment plan. A schedule for future care needs was explained. If any questions should arise after returning home, I have encouraged the patient to feel free to contact Dr. Quinn. The patient states understanding and agreement with this plan.   o Pt to monitor for problems and to contact Dr. Quinn for follow-up should such signs occur. Patient states understanding and agreement with this plan.   o Onychomycosis is present in 2-3% of the population with the most common source of contamination coming from the patient's own skin. Fifteen to 20 percent of people between the ages of 40 and 60 have onychomycosis, 32 percent of 60- to [id number] have nail fungus and approximately 50 percent of those over 70 are afflicted. Seventy-five percent of the people who have this infection exhibit psychosocial concerns. These people face the dilemma of not being able to go to the swimming pool, public shower areas or even wear open-toed sandals. More important is the fact that 48 percent of the people with onychomycosis have pain. The pain is intense enough to have these patients miss 1.8 days of work on average over a six-month period. Traditional topical therapies used alone are generally ineffective for clearing the primary infection, and even oral therapy is associated with a high rate of initial treatment failure or recurrence. In many patients combination oral and topical therapy is the treatment of choice.   o I have recommended debridement of the devitalized or contaminated tissue. Debridement  will relieve the pressure of the necrotic presence of on the nail and provides for a better cosmetic appearance. Debulking the nail does help in combination with other treatments in that it can decrease the fungal load of the nail itself.   o Electronically Identified Patient Education Materials Provided Electronically  · Disposition  o Call or Return if symptoms worsen or persist.  · Referrals  o ID: [id number] Date: 04/08/2021 Type: Inbound  Specialty: Podiatry            Electronically Signed by: Alexei Quinn DPM -Author on April 21, 2021 03:14:47 PM

## 2021-05-13 NOTE — PROGRESS NOTES
Progress Note      Patient Name: Debi Schroeder   Patient ID: [id number]   Sex: Female   YOB: 1975    Primary Care Provider: Sarah RYAN   Referring Provider: Sarah RYAN    Visit Date: June 26, 2020    Provider: CONNOR Sommers   Location: [facility]   Location Address: [address] [place] [address], [address] [place] [address]  [id number]   Location Phone: [phone]          Chief Complaint  · follow up      History Of Present Illness  Debi Schroeder is a 44 year old /White female who presents for evaluation and treatment of:      Follow-up and med refills.    She states she did see the surgeon about her boils of her armpits and was told that she does have hydradenitis suppurative.  She states he wants to do a surgery but states she would be off work for 3 weeks and she would have to work this out first.  She states she is going to think about it.  She is currently on doxycycline for staph infection.    She is complaining of abnormal periods with cramping.  She also has a history of abnormal Pap with HPV.  She needs a referral to OB/GYN.    She is complaining of right ankle pain states she twisted it twice and it just continues to hurt now.  She would like to have an x-ray.    She is a current smoker, smokes about 1 pack/day.  States she would like to quit smoking but her spouse smokes and will not stop smoking in the house.  He does not want to quit smoking.  She states that this would just make it much harder for her.    She has a history of borderline diabetes: Her last A1c 6.0% on 3/23/2020.  She is diet controlled.    Overactive bladder: She was started on Detrol 1 mg twice daily but she states is not seeming to help.    History of anxiety: She was started on Effexor XR 37.5 mg once daily and states she has not noticed any difference in her anxiety.  She does take hydroxyzine as needed.    She is complaining of migraine headaches 1 or 2/month.  She is  on propranolol 40 mg twice daily.       Past Medical History  Disease Name Date Onset Notes   Abdominal pain --  --    Allergic rhinitis due to allergen 03/23/2020 --    Arthritis --  --    Bladder problem --  --    COPD --  --    Degenerative Disc Disease  --  --    Depression with anxiety --  --    Dysmenorrhea 06/26/2020 --    Essential hypertension 03/23/2020 --    Herniated Disc --  --    Hidradenitis suppurativa --  --    Hidradenitis suppurativa of left axilla 03/23/2020 --    HPV (human papilloma virus) infection --  --    Hyperlipidemia --  --    Hypertension, Benign Essential --  --    Impaired fasting glucose 03/23/2020 --    Leg pain --  --    Leg swelling --  --    Migraines --  --    Neuropathy --  --    OAB (overactive bladder) 03/23/2020 --    Pain management --  --    PTSD (post-traumatic stress disorder) --  --    Spinal stenosis --  --    Thyroid nodule --  --          Past Surgical History  Procedure Name Date Notes   Bladder Surgery 1987 or 1988 --    Cholecstectomy 2019 --    Kidney Surgery 87 or 88 --          Medication List  Name Date Started Instructions   albuterol sulfate 90 mcg/actuation inhalation HFA aerosol inhaler 06/26/2020 INHALE 1 PUFF BY MOUTH EVERY 6 HOURS AS NEEDED   Detrol 2 mg oral tablet 06/26/2020 take 1 tablet (2 mg) by oral route 2 times per day for 30 days   doxycycline hyclate 100 mg oral capsule 06/10/2020 take 1 capsule (100 mg) by oral route every 12 hours for 21 days   Effexor XR 75 mg oral capsule,extended release 24hr 06/26/2020 take 1 capsule (75 mg) by oral route once daily with food for 30 days   fexofenadine 180 mg oral tablet 06/26/2020 take 1 tablet (180 mg) by oral route once daily for 30 days   hydroxyzine HCl 10 mg oral tablet 03/23/2020 take 1-2 tablets by oral route 2 times a day as needed for 30 days   oxycodone 10 mg oral tablet  take 2 tablets by oral route daily   propranolol 40 mg oral tablet 06/26/2020 TAKE 1 TABLET (40 MG) BY ORAL ROUTE 2 TIMES  PER DAY FOR 90 DAYS   tizanidine 4 mg oral tablet 2020 TAKE 1 TABLET (4 MG) BY ORAL ROUTE EVERY 8 HOURS AS NEEDED FOR 30 DAYS         Allergy List  Allergen Name Date Reaction Notes   PENICILLINS --  --  --          Family Medical History  Disease Name Relative/Age Notes   Family history of Arthritis Father/  Mother/   Mother; Father   Family history of heart disease Mother/   --    Family history of diabetes mellitus Mother/   Maternal grandparent/Paternal grandparent         Reproductive History  Menstrual   Last Menstrual Period: 2019   Pregnancy Summary   Total Pregnancies: 0 Full Term: 0 Premature: 0   Ab Induced: 0 Ab Spontaneous: 0 Ectopics: 0   Multiples: 0 Livin         Social History  Finding Status Start/Stop Quantity Notes   Alcohol Never --/-- --  does not drink, less than 1 drink per day, has been drinking for less than 1 year   Tobacco Current every day --/-- 1 pk/day --    Unemployed --  --/-- --  --          Immunizations  NameDate Admin Mfg Trade Name Lot Number Route Inj VIS Given VIS Publication   Ijyewfbpb00/15/2019 [facility] Fluzone Quadrivalent [id number] IM LA 02/15/2019 2015   Comments:          Review of Systems  · Constitutional  o Denies  o : fever, fatigue, weight loss, weight gain  · HENT  o Admits  o : headaches  o Denies  o : nasal congestion, sore throat  · Cardiovascular  o Denies  o : lower extremity edema, claudication, chest pressure, palpitations  · Respiratory  o Denies  o : shortness of breath, wheezing, cough, hemoptysis, dyspnea on exertion  · Gastrointestinal  o Denies  o : nausea, vomiting, diarrhea, constipation, abdominal pain  · Genitourinary  o Admits  o : urgency, frequency, incontinence, dysmenorrhea  o Denies  o : dysuria  · Integument  o Denies  o : rash, itching  · Musculoskeletal  o Admits  o : ankle pain  o Denies  o : joint pain, joint swelling, limitation of motion  · Psychiatric  o Admits  o : anxiety, difficulty sleeping  o Denies  o :  "depression, suicidal ideation, homicidal ideation      Vitals  Date Time BP Position Site L\R Cuff Size HR RR TEMP (F) WT  HT  BMI kg/m2 BSA m2 O2 Sat HC       06/26/2020 10:11 AM       16  266lbs 0oz 5'  7\" 41.66 2.39     06/26/2020 12:56 /94 Sitting    87 - R  97.7 262lbs 8oz 5'  7\" 41.11 2.37 96 %          Physical Examination  · Constitutional  o Appearance  o : no acute distress, well-nourished  · Head and Face  o Head  o :   § Inspection  § : atraumatic, normocephalic  · Neck  o Thyroid  o : gland size normal, nontender, no nodules or masses present on palpation, symmetric  · Respiratory  o Respiratory Effort  o : breathing comfortably, symmetric chest rise  o Auscultation of Lungs  o : clear to asculatation bilaterally, no wheezes, rales, or rhonchii  · Cardiovascular  o Heart  o :   § Auscultation of Heart  § : regular rate and rhythm, no murmurs, rubs, or gallops  o Peripheral Vascular System  o :   § Extremities  § : no edema  · Lymphatic  o Neck  o : no lymphadenopathy present  · Neurologic  o Mental Status Examination  o :   § Orientation  § : grossly oriented to person, place and time  o Gait and Station  o :   § Gait Screening  § : normal gait  · Psychiatric  o General  o : normal mood and affect  o Presence of Abnormal Thoughts  o : no hallucinations, no delusions present, no psychotic thoughts, no homicidal ideation, no suicidal ideation, no evidence of obsessional thinking          Assessment  · Visit for screening mammogram     V76.12/Z12.31  · Impaired fasting glucose     790.21/R73.01  · Cigarette nicotine dependence without complication     305.1/F17.210  · Hidradenitis suppurativa of left axilla     705.83/L73.2  · OAB (overactive bladder)     596.51/N32.81  · Depression with anxiety     300.4/F41.8  · Migraines     346.90/G43.909  · HPV (human papilloma virus) infection     079.4/B97.7  · Dysmenorrhea     625.3/N94.6  · Right ankle pain     719.47/M25.571    Problems " Reconciled  Plan  · Orders  o Screening Mammography; Bilateral 3D (39820, 74079, ) - V76.12/Z12.31 - 06/26/2020  o Hgb A1c [facility] (69245) - 790.21/R73.01 - 06/26/2020  o ACO-17: Screened for tobacco use AND received tobacco cessation intervention (4004F) - - 06/26/2020  o CBC with Auto Diff [facility] (89522) - 596.51/N32.81, 790.21/R73.01, 300.4/F41.8 - 06/26/2020  o CMP [facility] (20072) - 596.51/N32.81, 790.21/R73.01, 300.4/F41.8 - 06/26/2020  o Thyroid Profile (84745, 95132, THYII) - 596.51/N32.81, 790.21/R73.01, 300.4/F41.8 - 06/26/2020  o ACO-39: Current medications updated and reviewed () - - 06/26/2020  o Ankle (Right) 3 views X-Ray [facility] ([id number]) - 719.47/M25.571 - 06/26/2020  o OB/GYN CONSULTATION (OBGYN) - 625.3/N94.6, 079.4/B97.7 - 06/26/2020  · Medications  o Nurtec ODT 75 mg oral tablet,disintegrating   SIG: place 1 tablet on top of tongue, allow to dissolve once as needed for migraine; max 1 dose/24 hrs   DISP: (8) tablets with 5 refills  Prescribed on 06/26/2020     o albuterol sulfate 90 mcg/actuation inhalation HFA aerosol inhaler   SIG: INHALE 1 PUFF BY MOUTH EVERY 6 HOURS AS NEEDED   DISP: (18) Gram with 5 refills  Adjusted on 06/26/2020     o Detrol 2 mg oral tablet   SIG: take 1 tablet (2 mg) by oral route 2 times per day for 30 days   DISP: (60) tablets with 5 refills  Adjusted on 06/26/2020     o Effexor XR 75 mg oral capsule,extended release 24hr   SIG: take 1 capsule (75 mg) by oral route once daily with food for 30 days   DISP: (30) capsules with 5 refills  Adjusted on 06/26/2020     o fexofenadine 180 mg oral tablet   SIG: take 1 tablet (180 mg) by oral route once daily for 30 days   DISP: (30) tablets with 5 refills  Adjusted on 06/26/2020     o propranolol 40 mg oral tablet   SIG: TAKE 1 TABLET (40 MG) BY ORAL ROUTE 2 TIMES PER DAY FOR 90 DAYS   DISP: (180) Tablet with 1 refills  Adjusted on 06/26/2020     o tizanidine 4 mg oral tablet   SIG: TAKE 1 TABLET (4 MG) BY ORAL ROUTE  EVERY 8 HOURS AS NEEDED FOR 30 DAYS   DISP: (90) Tablet with 1 refills  Adjusted on 06/26/2020     o cetirizine 10 mg oral tablet   SIG: TAKE 1 TABLET (10 MG) BY ORAL ROUTE ONCE DAILY FOR 90 DAYS   DISP: (90) Tablet with 0 refills  Discontinued on 06/26/2020     o Medications have been Reconciled  o Transition of Care or Provider Policy  · Instructions  o Instructed patient to watch their diet and exercise.  o *Form of nicotine being used: Cigarettes  o Patient was strongly encouraged to discontinue use of any nicotine containing product or minimize the use of the product.  o Discussed smoking cessation and counseling with patient for over 3 minutes.  o Patient was educated/instructed on their diagnosis, treatment and medications prior to discharge from the clinic today.  o Patient instructed to seek medical attention urgently for new or worsening symptoms.  o Call the office with any concerns or questions.  · Disposition  o Return to clinic in 3 months     Overactive bladder not well controlled, will increase Detrol 2 mg twice daily.    Anxiety not well controlled, will increase Effexor 75 mg once daily.    Migraines, will start her on Nurtec ODT at onset of a migraine.    We will refer her to OB/GYN for abnormal periods and previous abnormal Pap with HPV.    Labs today, will call with results.    We will order x-ray right ankle, will call with results when available.             Electronically Signed by: CONNOR Sommers -Author on June 26, 2020 04:02:53 PM

## 2021-05-13 NOTE — PROGRESS NOTES
"   Progress Note      Patient Name: Debi Schroeder   Patient ID: [id number]   Sex: Female   YOB: 1975    Primary Care Provider: Sarah RYAN   Referring Provider: Sarah RYAN    Visit Date: Aggie 10, 2020    Provider: CONNOR Sommers   Location: [facility]   Location Address: [address] [place] [address], [address]  [id number]   Location Phone: [phone]          Chief Complaint  · Bumps in armpits (swollen, raw and tender)      History Of Present Illness  Debi Schroeder is a 44 year old /White female who presents for evaluation and treatment of:      She is complaining of infected boils in her left axilla area that are tender, swollen and raw.  She has a history of frequent boils.  She has had abscesses removed from her right axilla area in the past.  She recently was treated with doxycycline x2 weeks about 3 months ago and she states it did help at that time.       Review of Systems  · Constitutional  o Denies  o : fever, fatigue, weight loss, weight gain  · Cardiovascular  o Denies  o : lower extremity edema, claudication, chest pressure, palpitations  · Respiratory  o Denies  o : shortness of breath, wheezing, cough, hemoptysis, dyspnea on exertion  · Gastrointestinal  o Denies  o : nausea, vomiting, diarrhea, constipation, abdominal pain  · Integument  o Admits  o : Boils to axilla area  o Denies  o : rash, itching      Vitals  Date Time BP Position Site L\R Cuff Size HR RR TEMP (F) WT  HT  BMI kg/m2 BSA m2 O2 Sat HC       06/10/2020 09:14 /94 Sitting    90 - R  98.4 266lbs 4oz 5'  7\" 41.7 2.39 93 %    06/10/2020 09:15 /94 Sitting                     Physical Examination  · Constitutional  o Appearance  o : no acute distress, well-nourished  · Head and Face  o Head  o :   § Inspection  § : atraumatic, normocephalic  · Respiratory  o Respiratory Effort  o : breathing comfortably, symmetric chest rise  o Auscultation of Lungs  o : " clear to asculatation bilaterally, no wheezes, rales, or rhonchii  · Cardiovascular  o Heart  o :   § Auscultation of Heart  § : regular rate and rhythm, no murmurs, rubs, or gallops  o Peripheral Vascular System  o :   § Extremities  § : no edema  · Skin and Subcutaneous Tissue  o General Inspection  o : small pustule present right axilla, multiple large left axillary region furuncles present  · Neurologic  o Mental Status Examination  o :   § Orientation  § : grossly oriented to person, place and time  o Gait and Station  o :   § Gait Screening  § : normal gait  · Psychiatric  o General  o : normal mood and affect          Assessment  · Axillary abscess, left     682.3/L02.419  · Folliculitis of left axilla     704.8/L73.9  · Folliculitis of right axilla     704.8/L73.9    Problems Reconciled  Plan  · Orders  o ACO-39: Current medications updated and reviewed () - - 06/10/2020  o Wound Culture with Sensitivities if indicated [facility] ([id number]) - 682.3/L02.419, 704.8/L73.9 - 06/10/2020   left axilla  o Wound Culture with Sensitivities if indicated [facility] (67496) - 704.8/L73.9 - 06/10/2020   right axilla  o GENERAL SURGERY (GNSUR) - 682.3/L02.419 - 06/10/2020   [facility], pt is off this Fri and Monday if they can see her then  · Medications  o doxycycline hyclate 100 mg oral capsule   SIG: take 1 capsule (100 mg) by oral route every 12 hours for 21 days   DISP: (42) capsules with 0 refills  Adjusted on 06/10/2020     o Medications have been Reconciled  o Transition of Care or Provider Policy  · Instructions  o Patient was educated/instructed on their diagnosis, treatment and medications prior to discharge from the clinic today.  o Patient instructed to seek medical attention urgently for new or worsening symptoms.  o Call the office with any concerns or questions.  · Disposition  o Call or Return if symptoms worsen or persist.     Wound culture obtained from both left and right axilla lesions.  We will go ahead and start her  on doxycycline 100 mg twice daily.  Will refer her to general surgery for left axilla abscess.             Electronically Signed by: CONNOR Sommers -Author on Aggie 10, 2020 12:06:18 PM

## 2021-05-13 NOTE — PROGRESS NOTES
Quick Note      Patient Name: Debi Schroeder   Patient ID: [id number]   Sex: Female   YOB: 1975    Primary Care Provider: Sarah RYAN   Referring Provider: Sarah RYAN    Visit Date: December 1, 2020    Provider: CONNOR Sommers   Location: [facility]   Location Address: [address] [address], [address] [place] [address]  [id number]   Location Phone: [phone]          History Of Present Illness  Video Conferencing Visit  Debi Schroeder is a 45 year old /White female who is presenting for evaluation via video conferencing via [employer]. Verbal consent obtained before beginning visit.   The following staff were present during this visit: Neha Aponte CMA and CONNOR Santizo.      She is presenting for follow-up via telehealth today.    Overactive bladder: She states that Detrol is not helping.  She is on 2 mg twice daily.  She used to be on Myrbetriq but her insurance would not cover it.    She states she still complains of fatigue.  She is following with hematology.  She states her hematologist is checking her for fibromyalgia versus rheumatoid arthritis.    She is following with pain management and states that they changed her pain medication from oxycodone to Percocet 7.5 mg 3 times daily and she states she is getting much better control of her pain.       Physical Examination  · Constitutional  o Appearance  o : no acute distress, well-nourished  · Head and Face  o Head  o :   § Inspection  § : atraumatic, normocephalic  · Respiratory  o Respiratory Effort  o : breathing comfortably, symmetric chest rise  · Neurologic  o Mental Status Examination  o :   § Orientation  § : grossly oriented to person, place and time  · Psychiatric  o General  o : normal mood and affect          Assessment  · Fatigue     780.79/R53.83  Chronic fatigue and is following with hematology and getting work-up for possible rheumatoid arthritis  versus fibromyalgia.  · OAB (overactive bladder)     596.51/N32.81  Overactive bladder not well controlled, will discontinue Detrol and start her on oxybutynin.      Plan  · Orders  o ACO-39: Current medications updated and reviewed (1159F, ) - - 12/01/2020  · Medications  o oxybutynin chloride 10 mg oral tablet extended release 24hr   SIG: take 1 tablet (10 mg) by oral route once daily for 30 days   DISP: (30) Tablet with 2 refills  Prescribed on 12/01/2020     o Detrol 2 mg oral tablet   SIG: take 1 tablet (2 mg) by oral route 2 times per day for 30 days   DISP: (60) tablets with 5 refills  Discontinued on 12/01/2020     · Instructions  o Plan Of Care:   o Patient instructed to seek medical attention urgently for new or worsening symptoms.  o Patient was educated/instructed on their diagnosis, treatment and medications.  o Call the office with any concerns or questions.  · Disposition  o Return to clinic in 4 weeks            Electronically Signed by: Sarah Bowers APRN -Author on December 1, 2020 11:34:49 AM

## 2021-05-14 VITALS
OXYGEN SATURATION: 95 % | TEMPERATURE: 97.3 F | DIASTOLIC BLOOD PRESSURE: 92 MMHG | HEART RATE: 84 BPM | WEIGHT: 267 LBS | HEIGHT: 67 IN | BODY MASS INDEX: 41.91 KG/M2 | SYSTOLIC BLOOD PRESSURE: 142 MMHG

## 2021-05-14 VITALS
HEIGHT: 67 IN | WEIGHT: 270.5 LBS | BODY MASS INDEX: 42.46 KG/M2 | OXYGEN SATURATION: 97 % | HEART RATE: 84 BPM | DIASTOLIC BLOOD PRESSURE: 88 MMHG | SYSTOLIC BLOOD PRESSURE: 124 MMHG | TEMPERATURE: 97.6 F

## 2021-05-14 VITALS
HEART RATE: 83 BPM | SYSTOLIC BLOOD PRESSURE: 127 MMHG | DIASTOLIC BLOOD PRESSURE: 67 MMHG | HEIGHT: 67 IN | TEMPERATURE: 97.3 F | WEIGHT: 171 LBS | BODY MASS INDEX: 26.84 KG/M2 | OXYGEN SATURATION: 97 %

## 2021-05-14 NOTE — PROGRESS NOTES
Progress Note      Patient Name: Debi Schroeder   Patient ID: [id number]   Sex: Female   YOB: 1975    Primary Care Provider: Sarah RYAN   Referring Provider: Sarah RYAN    Visit Date: January 5, 2021    Provider: CONNOR Sommers   Location: [facility]   Location Address: [address] [place] [address], [address]  [id number]   Location Phone: [phone]          Chief Complaint  · Follow-up on overactive bladder      History Of Present Illness  Video Conferencing Visit  Debi Schroeder is a 45 year old /White female who is presenting for evaluation via video conferencing via [employer]. Verbal consent obtained before beginning visit.   The following staff were present during this visit: CONNOR Santizo.   Debi Schroeder is a 45 year old /White female who presents for evaluation and treatment of:      1 month follow-up on overactive bladder.  Previously her Detrol was not working so we switched her to oxybutynin 10 mg extended release.  She states that this seemed to help at first but lately she has been having more urinary urgency again.       Review of Systems  · Constitutional  o Denies  o : fever, fatigue, weight loss, weight gain  · Cardiovascular  o Denies  o : lower extremity edema, claudication, chest pressure, palpitations  · Respiratory  o Denies  o : shortness of breath, wheezing, cough, hemoptysis, dyspnea on exertion  · Gastrointestinal  o Denies  o : nausea, vomiting, diarrhea, constipation, abdominal pain  · Genitourinary  o Admits  o : urgency  o Denies  o : frequency, dysuria      Physical Examination  · Constitutional  o Appearance  o : no acute distress, well-nourished  · Head and Face  o Head  o :   § Inspection  § : atraumatic, normocephalic  · Respiratory  o Respiratory Effort  o : breathing comfortably, symmetric chest rise  · Neurologic  o Mental Status Examination  o :    § Orientation  § : grossly oriented to person, place and time  · Psychiatric  o General  o : normal mood and affect          Assessment  · OAB (overactive bladder)     596.51/N32.81  Overactive bladder not well controlled, will increase oxybutynin 20 mg once daily. I will have her follow-up with me in 3 months but sooner if she is not improving.      Plan  · Orders  o ACO-39: Current medications updated and reviewed (, 1159F) - - 01/05/2021  · Medications  o oxybutynin chloride 10 mg oral tablet extended release 24hr   SIG: take 2 tablets (20 mg) by oral route once daily for 30 days   DISP: (60) Tablet with 2 refills  Adjusted on 01/05/2021     o Medications have been Reconciled  o Transition of Care or Provider Policy  · Instructions  o Patient was educated/instructed on their diagnosis, treatment and medications.  o Patient instructed to seek medical attention urgently for new or worsening symptoms.  o Call the office with any concerns or questions.  · Disposition  o Call or Return if symptoms worsen or persist.  o Return to clinic in 3 months            Electronically Signed by: CONNOR Sommers -Author on January 5, 2021 11:14:22 AM

## 2021-05-14 NOTE — PROGRESS NOTES
Progress Note      Patient Name: Debi Schroeder   Patient ID: [id number]   Sex: Female   YOB: 1975    Primary Care Provider: Sarah RYAN   Referring Provider: Sarah RYAN    Visit Date: April 5, 2021    Provider: CONNOR Sommers   Location: [facility]   Location Address: [address] [place] [address], [address]  [id number]   Location Phone: [phone]          Chief Complaint  · follow up      History Of Present Illness  Debi Schroeder is a 45 year old /White female who presents for evaluation and treatment of:      She is here for follow-up.    History of chronic allergies: She states that she had been taking fexofenadine but states the pharmacy gave her a different generic brand and it does not seem to work well.    Overactive bladder: She had tried Detrol which did not work for her and then we started her on oxybutynin which she states made her urinary frequency worse.  She had been on Myrbetriq in the past which seemed to help.    Prediabetes: Her last A1c was 5.8% on 9/1/2020, we will recheck that today.    Hypertension: Blood pressure stable, on propranolol 40 mg twice daily.  She is on the propranolol for her migraines.    She is complaining of a left ingrown toenail and would like a referral to podiatry.    She is overdue for her Pap smear.  She states she has had abnormal Pap smears with positive HPV and abnormal cells.    Current daily smoker, smokes about 1 pack/day.  She states she tried quitting with patches but she states her  continues to smoke in that makes it harder for her to quit.    She has a mammogram ordered but she had to cancel the appointment, I gave her the information to reschedule her mammogram.       Past Medical History  Disease Name Date Onset Notes   Abdominal pain --  --    Allergic rhinitis due to allergen 03/23/2020 --    Arthritis --  --    Bladder problem --  --    COPD --  --     Degenerative Disc Disease  --  --    Depression with anxiety --  --    Dysmenorrhea 06/26/2020 --    Essential hypertension 03/23/2020 --    Herniated Disc --  --    Hidradenitis suppurativa --  --    Hidradenitis suppurativa of left axilla 03/23/2020 --    HPV (human papilloma virus) infection --  --    Hyperlipidemia --  --    Hypertension, Benign Essential --  --    Impaired fasting glucose 03/23/2020 --    Leg pain --  --    Leg swelling --  --    Migraines --  --    Neuropathy --  --    OAB (overactive bladder) 03/23/2020 --    Pain management --  --    PTSD (post-traumatic stress disorder) --  --    Spinal stenosis --  --    Thyroid nodule --  --          Past Surgical History  Procedure Name Date Notes   Bladder Surgery 1987 or 1988 --    Cholecstectomy 2019 --    Kidney Surgery 87 or 88 --          Medication List  Name Date Started Instructions   albuterol sulfate 90 mcg/actuation inhalation HFA aerosol inhaler 06/26/2020 INHALE 1 PUFF BY MOUTH EVERY 6 HOURS AS NEEDED   FEXOFENADINE 180MG TABLETS (OTC) 04/02/2021 TAKE 1 TABLET(180 MG) BY MOUTH EVERY DAY   methocarbamol 750 mg oral tablet  take 1 tablet (750 mg) by oral route 3 times per day   Percocet 7.5-325 mg oral tablet  take 1 tablet by oral route every 6 hours as needed   propranolol 40 mg oral tablet 06/26/2020 TAKE 1 TABLET (40 MG) BY ORAL ROUTE 2 TIMES PER DAY FOR 90 DAYS   rizatriptan 10 mg oral tablet,disintegrating 07/01/2020 Place 1 tab on top of tongue to dissolve, may repeat at 2 hour intervals; do not exceed 30 mg in 24 hours   tizanidine 4 mg oral tablet 06/26/2020 TAKE 1 TABLET (4 MG) BY ORAL ROUTE EVERY 8 HOURS AS NEEDED FOR 30 DAYS         Allergy List  Allergen Name Date Reaction Notes   oxybutynin --  frequent urination --    PENICILLINS --  --  --          Family Medical History  Disease Name Relative/Age Notes   Family history of Arthritis Father/  Mother/   Mother; Father   Family history of heart disease Mother/   --    Family  "history of diabetes mellitus Mother/   Maternal grandparent/Paternal grandparent         Reproductive History  Menstrual   Last Menstrual Period: 2019   Pregnancy Summary   Total Pregnancies: 0 Full Term: 0 Premature: 0   Ab Induced: 0 Ab Spontaneous: 0 Ectopics: 0   Multiples: 0 Livin         Social History  Finding Status Start/Stop Quantity Notes   Alcohol Never --/-- --  does not drink, less than 1 drink per day, has been drinking for less than 1 year   Tobacco Current every day --/-- 1 pk/day --    Unemployed --  --/-- --  --          Immunizations  NameDate Admin Mfg Trade Name Lot Number Route Inj VIS Given VIS Publication   Mokpgsisf59/15/2019 [facility] Fluzone Quadrivalent [id number] IM LA 02/15/2019 2015   Comments:          Review of Systems  · Constitutional  o Denies  o : fever, fatigue, weight loss, weight gain  · HENT  o Admits  o : nasal discharge  o Denies  o : nasal congestion, sore throat, ear pain  · Cardiovascular  o Denies  o : lower extremity edema, claudication, chest pressure, palpitations  · Respiratory  o Denies  o : shortness of breath, wheezing, cough, hemoptysis, dyspnea on exertion  · Gastrointestinal  o Denies  o : nausea, vomiting, diarrhea, constipation, abdominal pain  · Genitourinary  o Admits  o : urgency, frequency, incontinence  o Denies  o : dysuria  · Integument  o Admits  o : nail changes  o Denies  o : rash, itching  · Allergic-Immunologic  o Admits  o : sinus allergy symptoms  o Denies  o : frequent illnesses      Vitals  Date Time BP Position Site L\R Cuff Size HR RR TEMP (F) WT  HT  BMI kg/m2 BSA m2 O2 Sat FR L/min FiO2        2021 10:37 /88 Sitting    84 - R  97.6 270lbs 8oz 5'  7\" 42.37 2.41 97 %            Physical Examination  · Constitutional  o Appearance  o : no acute distress, well-nourished  · Head and Face  o Head  o :   § Inspection  § : atraumatic, normocephalic  · Neck  o Thyroid  o : gland size normal, nontender, no nodules or masses " present on palpation, symmetric  · Respiratory  o Respiratory Effort  o : breathing comfortably, symmetric chest rise  o Auscultation of Lungs  o : clear to asculatation bilaterally, no wheezes, rales, or rhonchii  · Cardiovascular  o Heart  o :   § Auscultation of Heart  § : regular rate and rhythm, no murmurs, rubs, or gallops  o Peripheral Vascular System  o :   § Extremities  § : no edema  · Lymphatic  o Neck  o : no lymphadenopathy present  · Skin and Subcutaneous Tissue  o General Inspection  o : Left great toenail intact, tenderness noted to lateral side.  · Neurologic  o Mental Status Examination  o :   § Orientation  § : grossly oriented to person, place and time  o Gait and Station  o :   § Gait Screening  § : normal gait  · Psychiatric  o General  o : normal mood and affect          Assessment  · Screening for cervical cancer     V76.2/Z12.4  I will get her referred to OB/GYN due to her history of HPV.  · Allergic rhinitis due to allergen     477.9/J30.9  · Essential hypertension     401.9/I10  · Impaired fasting glucose     790.21/R73.01  · OAB (overactive bladder)     596.51/N32.81  · HPV (human papilloma virus) infection     079.4/B97.7  · Ingrown toenail of left foot     703.0/L60.0  I will get her referred to podiatry.      Plan  · Orders  o OB/GYN CONSULTATION (OBGYN) - V76.2/Z12.4, 079.4/B97.7 - 04/05/2021   needs PAP, has not had one in years, history HPV   needs Tues or Wed apt  o HTN/Lipid Panel (CMP, Lipid) [facility] (37174, [address]312) - 401.9/I10 - 04/05/2021   FYI for Sarah: Order labs for her next 6-month follow-up.  o CBC with Auto Diff [facility] (79853) - 401.9/I10 - 04/05/2021  o Hgb A1c [facility] (62785) - 790.21/R73.01 - 04/05/2021  o ACO-18: Negative screen for clinical depression using a standardized tool () - - 04/05/2021   4 pts  o ACO-39: Current medications updated and reviewed (1209F, ) - - 04/05/2021  o PODIATRY CONSULTATION (PODIA) - 703.0/L60.0 - 04/05/2021   Dr. Quinn or [facility]; needs Tues or Wed apt  · Medications  o Myrbetriq 50 mg oral tablet extended release 24 hr   SIG: take 1 tablet (50 mg) by oral route once daily swallowing whole with water. Do not crush, chew and/or divide. for 30 days   DISP: (30) Tablet with 5 refills  Prescribed on 04/05/2021     o fluticasone propionate 50 mcg/actuation nasal spray,suspension   SIG: spray 1 - 2 sprays in each nostril by intranasal route once daily   DISP: (1) Inhaler with 5 refills  Prescribed on 04/05/2021     o cetirizine 10 mg oral tablet   SIG: take 1 tablet (10 mg) by oral route once daily for 30 days   DISP: (30) Tablet with 5 refills  Prescribed on 04/05/2021     o fexofenadine 180 mg oral tablet   SIG: take 1 tablet (180 mg) by oral route once daily for 30 days   DISP: (30) tablets with 5 refills  Discontinued on 04/05/2021     o Medications have been Reconciled  o Transition of Care or Provider Policy  · Instructions  o Instructed patient to watch their diet and exercise.  o Patient was educated/instructed on their diagnosis, treatment and medications prior to discharge from the clinic today.  o Patient instructed to seek medical attention urgently for new or worsening symptoms.  o Call the office with any concerns or questions.  · Disposition  o Return to clinic in 6 months  o Instructed patient to get labs 1 week prior to next follow-up appointment.            Electronically Signed by: Sarah Bowers APRN -Author on April 5, 2021 12:31:30 PM

## 2021-05-15 VITALS
BODY MASS INDEX: 42.28 KG/M2 | WEIGHT: 269.37 LBS | HEIGHT: 67 IN | TEMPERATURE: 99.1 F | OXYGEN SATURATION: 97 % | SYSTOLIC BLOOD PRESSURE: 136 MMHG | HEART RATE: 97 BPM | DIASTOLIC BLOOD PRESSURE: 88 MMHG

## 2021-05-15 VITALS
HEART RATE: 76 BPM | DIASTOLIC BLOOD PRESSURE: 87 MMHG | WEIGHT: 263.37 LBS | HEIGHT: 67 IN | BODY MASS INDEX: 41.34 KG/M2 | SYSTOLIC BLOOD PRESSURE: 141 MMHG

## 2021-05-15 VITALS
BODY MASS INDEX: 40.71 KG/M2 | HEART RATE: 82 BPM | RESPIRATION RATE: 16 BRPM | TEMPERATURE: 98.3 F | OXYGEN SATURATION: 95 % | WEIGHT: 259.37 LBS | DIASTOLIC BLOOD PRESSURE: 79 MMHG | SYSTOLIC BLOOD PRESSURE: 142 MMHG | HEIGHT: 67 IN

## 2021-05-15 VITALS
WEIGHT: 260.25 LBS | BODY MASS INDEX: 40.85 KG/M2 | OXYGEN SATURATION: 97 % | SYSTOLIC BLOOD PRESSURE: 124 MMHG | HEART RATE: 87 BPM | DIASTOLIC BLOOD PRESSURE: 62 MMHG | HEIGHT: 67 IN

## 2021-05-15 VITALS
SYSTOLIC BLOOD PRESSURE: 108 MMHG | OXYGEN SATURATION: 96 % | HEART RATE: 78 BPM | WEIGHT: 259.12 LBS | BODY MASS INDEX: 40.67 KG/M2 | DIASTOLIC BLOOD PRESSURE: 72 MMHG | HEIGHT: 67 IN

## 2021-05-15 VITALS
SYSTOLIC BLOOD PRESSURE: 136 MMHG | BODY MASS INDEX: 41.59 KG/M2 | DIASTOLIC BLOOD PRESSURE: 92 MMHG | HEIGHT: 67 IN | HEART RATE: 76 BPM | WEIGHT: 265 LBS

## 2021-05-15 VITALS
BODY MASS INDEX: 40.38 KG/M2 | OXYGEN SATURATION: 96 % | SYSTOLIC BLOOD PRESSURE: 138 MMHG | DIASTOLIC BLOOD PRESSURE: 72 MMHG | WEIGHT: 257.25 LBS | HEIGHT: 67 IN | TEMPERATURE: 98.9 F | HEART RATE: 82 BPM

## 2021-05-15 VITALS — BODY MASS INDEX: 42.06 KG/M2 | RESPIRATION RATE: 14 BRPM | WEIGHT: 268 LBS | HEIGHT: 67 IN

## 2021-05-15 VITALS
OXYGEN SATURATION: 96 % | WEIGHT: 262.5 LBS | DIASTOLIC BLOOD PRESSURE: 94 MMHG | TEMPERATURE: 97.7 F | HEIGHT: 67 IN | HEART RATE: 87 BPM | BODY MASS INDEX: 41.2 KG/M2 | SYSTOLIC BLOOD PRESSURE: 144 MMHG

## 2021-05-15 VITALS — HEART RATE: 93 BPM | OXYGEN SATURATION: 97 % | BODY MASS INDEX: 41.28 KG/M2 | WEIGHT: 263 LBS | HEIGHT: 67 IN

## 2021-05-15 VITALS
BODY MASS INDEX: 42.44 KG/M2 | OXYGEN SATURATION: 95 % | TEMPERATURE: 98.7 F | HEIGHT: 67 IN | DIASTOLIC BLOOD PRESSURE: 78 MMHG | SYSTOLIC BLOOD PRESSURE: 148 MMHG | WEIGHT: 270.37 LBS | HEART RATE: 84 BPM

## 2021-05-15 VITALS
OXYGEN SATURATION: 94 % | BODY MASS INDEX: 42.26 KG/M2 | DIASTOLIC BLOOD PRESSURE: 84 MMHG | WEIGHT: 269.25 LBS | SYSTOLIC BLOOD PRESSURE: 110 MMHG | HEART RATE: 91 BPM | HEIGHT: 67 IN | TEMPERATURE: 98.6 F

## 2021-05-15 VITALS — WEIGHT: 266 LBS | RESPIRATION RATE: 16 BRPM | BODY MASS INDEX: 41.75 KG/M2 | HEIGHT: 67 IN

## 2021-05-15 VITALS
DIASTOLIC BLOOD PRESSURE: 75 MMHG | WEIGHT: 264 LBS | HEIGHT: 67 IN | HEART RATE: 81 BPM | SYSTOLIC BLOOD PRESSURE: 135 MMHG | BODY MASS INDEX: 41.44 KG/M2

## 2021-05-15 VITALS
BODY MASS INDEX: 40.71 KG/M2 | WEIGHT: 259.37 LBS | HEIGHT: 67 IN | TEMPERATURE: 98.2 F | SYSTOLIC BLOOD PRESSURE: 110 MMHG | DIASTOLIC BLOOD PRESSURE: 58 MMHG | HEART RATE: 81 BPM | OXYGEN SATURATION: 98 %

## 2021-05-15 VITALS — BODY MASS INDEX: 42.38 KG/M2 | HEART RATE: 95 BPM | OXYGEN SATURATION: 97 % | HEIGHT: 67 IN | WEIGHT: 270 LBS

## 2021-05-15 VITALS — RESPIRATION RATE: 16 BRPM | BODY MASS INDEX: 40.97 KG/M2 | HEIGHT: 67 IN | WEIGHT: 261 LBS

## 2021-05-15 VITALS — WEIGHT: 263 LBS | HEIGHT: 67 IN | BODY MASS INDEX: 41.28 KG/M2 | RESPIRATION RATE: 14 BRPM

## 2021-05-15 VITALS
WEIGHT: 266.25 LBS | BODY MASS INDEX: 41.79 KG/M2 | TEMPERATURE: 98.4 F | OXYGEN SATURATION: 93 % | HEART RATE: 90 BPM | HEIGHT: 67 IN | SYSTOLIC BLOOD PRESSURE: 160 MMHG | DIASTOLIC BLOOD PRESSURE: 94 MMHG

## 2021-05-18 ENCOUNTER — HOSPITAL ENCOUNTER (OUTPATIENT)
Dept: URGENT CARE | Facility: CLINIC | Age: 46
Discharge: HOME | End: 2021-05-18
Attending: EMERGENCY MEDICINE

## 2021-06-24 ENCOUNTER — TELEPHONE (OUTPATIENT)
Dept: FAMILY MEDICINE CLINIC | Facility: CLINIC | Age: 46
End: 2021-06-24

## 2021-06-24 NOTE — TELEPHONE ENCOUNTER
Caller: Debi Schroeder    Relationship to patient: Self    Best call back number: [phone]    Chief complaint: INCONTINENT ISSUES     Type of visit: OFFICE     Requested date:ASAP     If rescheduling, when is the original appointment:JUNE 30 AT 3:45 PM     Additional notes:PATIENT NEEDS AN EARLY APPOINTMENT BECAUSE OF WORK SCHEDULE.  PATIENT HAS TO BE ABLE TO LEAVE THE OFFICE NO LATER THAN 2:00 PM.  PATIENT WOULD ALSO LIKE TO SPEAK WITH PROVIDER ABOUT MEDICATION AS WELL.    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    PLEASE ADVISE [phone]

## 2021-06-28 ENCOUNTER — TELEPHONE (OUTPATIENT)
Dept: FAMILY MEDICINE CLINIC | Facility: CLINIC | Age: 46
End: 2021-06-28

## 2021-06-29 RX ORDER — FESOTERODINE FUMARATE 4 MG/1
4 TABLET, FILM COATED, EXTENDED RELEASE ORAL
Qty: 30 TABLET | Refills: 5 | Status: SHIPPED | OUTPATIENT
Start: 2021-06-29 | End: 2021-10-11 | Stop reason: DRUGHIGH

## 2021-08-13 RX ORDER — ALBUTEROL SULFATE 90 UG/1
AEROSOL, METERED RESPIRATORY (INHALATION)
Qty: 18 G | Refills: 5 | Status: SHIPPED | OUTPATIENT
Start: 2021-08-13 | End: 2022-11-02 | Stop reason: SDUPTHER

## 2021-08-24 RX ORDER — PROPRANOLOL HYDROCHLORIDE 40 MG/1
TABLET ORAL
Qty: 180 TABLET | Refills: 0 | Status: SHIPPED | OUTPATIENT
Start: 2021-08-24 | End: 2021-10-11 | Stop reason: SDUPTHER

## 2021-09-08 ENCOUNTER — TELEPHONE (OUTPATIENT)
Dept: OBSTETRICS AND GYNECOLOGY | Facility: CLINIC | Age: 46
End: 2021-09-08

## 2021-09-20 RX ORDER — CETIRIZINE HYDROCHLORIDE 10 MG/1
TABLET ORAL
Qty: 30 TABLET | Refills: 0 | Status: SHIPPED | OUTPATIENT
Start: 2021-09-20 | End: 2021-10-11 | Stop reason: SDUPTHER

## 2021-09-24 ENCOUNTER — TELEPHONE (OUTPATIENT)
Dept: FAMILY MEDICINE CLINIC | Facility: CLINIC | Age: 46
End: 2021-09-24

## 2021-09-24 DIAGNOSIS — R53.82 CHRONIC FATIGUE: ICD-10-CM

## 2021-09-24 DIAGNOSIS — E78.2 MODERATE MIXED HYPERLIPIDEMIA NOT REQUIRING STATIN THERAPY: Primary | ICD-10-CM

## 2021-09-24 DIAGNOSIS — I10 ESSENTIAL HYPERTENSION: ICD-10-CM

## 2021-09-24 DIAGNOSIS — R73.01 IMPAIRED FASTING GLUCOSE: ICD-10-CM

## 2021-09-24 NOTE — TELEPHONE ENCOUNTER
Caller: Debi Schroeder    Relationship: Self    Best call back number: [phone]    What orders are you requesting (i.e. lab or imaging): BLOOD WORK ORDERS     In what timeframe would the patient need to come in: BEFORE APPOINTMENT ON 10.11.2021    Where will you receive your lab/imaging services: IN OFFICE LAB

## 2021-09-30 ENCOUNTER — LAB (OUTPATIENT)
Dept: FAMILY MEDICINE CLINIC | Facility: CLINIC | Age: 46
End: 2021-09-30

## 2021-09-30 DIAGNOSIS — R73.01 IMPAIRED FASTING GLUCOSE: ICD-10-CM

## 2021-09-30 DIAGNOSIS — R53.82 CHRONIC FATIGUE: ICD-10-CM

## 2021-09-30 DIAGNOSIS — I10 ESSENTIAL HYPERTENSION: ICD-10-CM

## 2021-09-30 DIAGNOSIS — E78.2 MODERATE MIXED HYPERLIPIDEMIA NOT REQUIRING STATIN THERAPY: ICD-10-CM

## 2021-09-30 LAB
ALBUMIN SERPL-MCNC: 4.3 G/DL (ref 3.5–5.2)
ALBUMIN/GLOB SERPL: 1.3 G/DL
ALP SERPL-CCNC: 99 U/L (ref 39–117)
ALT SERPL W P-5'-P-CCNC: 22 U/L (ref 1–33)
ANION GAP SERPL CALCULATED.3IONS-SCNC: 11.3 MMOL/L (ref 5–15)
AST SERPL-CCNC: 25 U/L (ref 1–32)
BASOPHILS # BLD AUTO: 0.04 10*3/MM3 (ref 0–0.2)
BASOPHILS NFR BLD AUTO: 0.4 % (ref 0–1.5)
BILIRUB SERPL-MCNC: 0.2 MG/DL (ref 0–1.2)
BUN SERPL-MCNC: 18 MG/DL (ref 6–20)
BUN/CREAT SERPL: 20.2 (ref 7–25)
CALCIUM SPEC-SCNC: 9.3 MG/DL (ref 8.6–10.5)
CHLORIDE SERPL-SCNC: 106 MMOL/L (ref 98–107)
CHOLEST SERPL-MCNC: 188 MG/DL (ref 0–200)
CO2 SERPL-SCNC: 20.7 MMOL/L (ref 22–29)
CREAT SERPL-MCNC: 0.89 MG/DL (ref 0.57–1)
DEPRECATED RDW RBC AUTO: 41.1 FL (ref 37–54)
EOSINOPHIL # BLD AUTO: 0.35 10*3/MM3 (ref 0–0.4)
EOSINOPHIL NFR BLD AUTO: 3.5 % (ref 0.3–6.2)
ERYTHROCYTE [DISTWIDTH] IN BLOOD BY AUTOMATED COUNT: 12.1 % (ref 12.3–15.4)
GFR SERPL CREATININE-BSD FRML MDRD: 69 ML/MIN/1.73
GLOBULIN UR ELPH-MCNC: 3.3 GM/DL
GLUCOSE SERPL-MCNC: 103 MG/DL (ref 65–99)
HBA1C MFR BLD: 5.84 % (ref 4.8–5.6)
HCT VFR BLD AUTO: 40.2 % (ref 34–46.6)
HDLC SERPL-MCNC: 27 MG/DL (ref 40–60)
HGB BLD-MCNC: 13.8 G/DL (ref 12–15.9)
IMM GRANULOCYTES # BLD AUTO: 0.03 10*3/MM3 (ref 0–0.05)
IMM GRANULOCYTES NFR BLD AUTO: 0.3 % (ref 0–0.5)
LDLC SERPL CALC-MCNC: 110 MG/DL (ref 0–100)
LDLC/HDLC SERPL: 3.81 {RATIO}
LYMPHOCYTES # BLD AUTO: 2.47 10*3/MM3 (ref 0.7–3.1)
LYMPHOCYTES NFR BLD AUTO: 24.6 % (ref 19.6–45.3)
MCH RBC QN AUTO: 32.2 PG (ref 26.6–33)
MCHC RBC AUTO-ENTMCNC: 34.3 G/DL (ref 31.5–35.7)
MCV RBC AUTO: 93.7 FL (ref 79–97)
MONOCYTES # BLD AUTO: 0.75 10*3/MM3 (ref 0.1–0.9)
MONOCYTES NFR BLD AUTO: 7.5 % (ref 5–12)
NEUTROPHILS NFR BLD AUTO: 6.42 10*3/MM3 (ref 1.7–7)
NEUTROPHILS NFR BLD AUTO: 63.7 % (ref 42.7–76)
NRBC BLD AUTO-RTO: 0 /100 WBC (ref 0–0.2)
PLATELET # BLD AUTO: 276 10*3/MM3 (ref 140–450)
PMV BLD AUTO: 11.2 FL (ref 6–12)
POTASSIUM SERPL-SCNC: 4.4 MMOL/L (ref 3.5–5.2)
PROT SERPL-MCNC: 7.6 G/DL (ref 6–8.5)
RBC # BLD AUTO: 4.29 10*6/MM3 (ref 3.77–5.28)
SODIUM SERPL-SCNC: 138 MMOL/L (ref 136–145)
TRIGL SERPL-MCNC: 291 MG/DL (ref 0–150)
VLDLC SERPL-MCNC: 51 MG/DL (ref 5–40)
WBC # BLD AUTO: 10.06 10*3/MM3 (ref 3.4–10.8)

## 2021-09-30 PROCEDURE — 36415 COLL VENOUS BLD VENIPUNCTURE: CPT | Performed by: NURSE PRACTITIONER

## 2021-09-30 PROCEDURE — 83036 HEMOGLOBIN GLYCOSYLATED A1C: CPT | Performed by: NURSE PRACTITIONER

## 2021-09-30 PROCEDURE — 82607 VITAMIN B-12: CPT | Performed by: NURSE PRACTITIONER

## 2021-09-30 PROCEDURE — 84439 ASSAY OF FREE THYROXINE: CPT | Performed by: NURSE PRACTITIONER

## 2021-09-30 PROCEDURE — 80061 LIPID PANEL: CPT | Performed by: NURSE PRACTITIONER

## 2021-09-30 PROCEDURE — 84443 ASSAY THYROID STIM HORMONE: CPT | Performed by: NURSE PRACTITIONER

## 2021-09-30 PROCEDURE — 82746 ASSAY OF FOLIC ACID SERUM: CPT | Performed by: NURSE PRACTITIONER

## 2021-09-30 PROCEDURE — 80053 COMPREHEN METABOLIC PANEL: CPT | Performed by: NURSE PRACTITIONER

## 2021-09-30 PROCEDURE — 85025 COMPLETE CBC W/AUTO DIFF WBC: CPT | Performed by: NURSE PRACTITIONER

## 2021-10-01 LAB
FOLATE SERPL-MCNC: 4.86 NG/ML (ref 4.78–24.2)
T4 FREE SERPL-MCNC: 1.14 NG/DL (ref 0.93–1.7)
TSH SERPL DL<=0.05 MIU/L-ACNC: 1.1 UIU/ML (ref 0.27–4.2)
VIT B12 BLD-MCNC: 753 PG/ML (ref 211–946)

## 2021-10-07 PROBLEM — J30.9 ALLERGIC RHINITIS DUE TO ALLERGEN: Status: ACTIVE | Noted: 2020-03-23

## 2021-10-07 PROBLEM — R73.01 IMPAIRED FASTING GLUCOSE: Status: ACTIVE | Noted: 2020-03-23

## 2021-10-07 PROBLEM — N31.8 HYPERTONICITY OF BLADDER: Status: ACTIVE | Noted: 2020-03-23

## 2021-10-07 PROBLEM — N94.6 DYSMENORRHEA: Status: ACTIVE | Noted: 2020-06-26

## 2021-10-07 PROBLEM — M48.00 SPINAL STENOSIS: Status: ACTIVE | Noted: 2021-10-07

## 2021-10-07 PROBLEM — L73.2 HIDRADENITIS SUPPURATIVA OF LEFT AXILLA: Status: ACTIVE | Noted: 2020-03-23

## 2021-10-07 PROBLEM — R10.9 ABDOMINAL PAIN: Status: ACTIVE | Noted: 2021-10-07

## 2021-10-07 PROBLEM — K58.9 IBS (IRRITABLE BOWEL SYNDROME): Status: ACTIVE | Noted: 2021-10-07

## 2021-10-07 PROBLEM — Z51.89 ENCOUNTER FOR OTHER SPECIFIED AFTERCARE: Status: ACTIVE | Noted: 2021-10-07

## 2021-10-07 PROBLEM — M19.90 ARTHRITIS: Status: ACTIVE | Noted: 2021-10-07

## 2021-10-07 PROBLEM — M79.606 LEG PAIN: Status: ACTIVE | Noted: 2021-10-07

## 2021-10-07 PROBLEM — N32.9 BLADDER PROBLEM: Status: ACTIVE | Noted: 2021-10-07

## 2021-10-07 PROBLEM — E04.1 THYROID NODULE: Status: ACTIVE | Noted: 2021-10-07

## 2021-10-07 PROBLEM — M79.89 LEG SWELLING: Status: ACTIVE | Noted: 2021-10-07

## 2021-10-07 PROBLEM — G62.9 NEUROPATHY: Status: ACTIVE | Noted: 2021-10-07

## 2021-10-07 PROBLEM — E78.5 HYPERLIPIDEMIA: Status: ACTIVE | Noted: 2021-10-07

## 2021-10-07 PROBLEM — R20.0 NUMBNESS IN FEET: Status: ACTIVE | Noted: 2021-10-07

## 2021-10-07 PROBLEM — I10 BENIGN ESSENTIAL HYPERTENSION: Status: ACTIVE | Noted: 2020-03-23

## 2021-10-07 PROBLEM — L60.0 INGROWN TOENAIL: Status: ACTIVE | Noted: 2021-10-07

## 2021-10-07 PROBLEM — G43.909 MIGRAINES: Status: ACTIVE | Noted: 2021-10-07

## 2021-10-07 PROBLEM — B97.7 HPV (HUMAN PAPILLOMA VIRUS) INFECTION: Status: ACTIVE | Noted: 2021-10-07

## 2021-10-07 PROBLEM — F41.8 DEPRESSION WITH ANXIETY: Status: ACTIVE | Noted: 2021-10-07

## 2021-10-07 RX ORDER — TIZANIDINE HYDROCHLORIDE 4 MG/1
CAPSULE, GELATIN COATED ORAL
COMMUNITY
End: 2021-12-10

## 2021-10-07 RX ORDER — RIZATRIPTAN BENZOATE 10 MG/1
TABLET, ORALLY DISINTEGRATING ORAL
COMMUNITY
Start: 2021-05-04 | End: 2021-10-11 | Stop reason: SDUPTHER

## 2021-10-07 RX ORDER — FENOFIBRATE 40 MG/1
TABLET ORAL
COMMUNITY
End: 2021-10-11 | Stop reason: SDUPTHER

## 2021-10-07 RX ORDER — DICYCLOMINE HCL 20 MG
TABLET ORAL
COMMUNITY
End: 2022-01-11

## 2021-10-07 RX ORDER — FLUTICASONE PROPIONATE 50 MCG
SPRAY, SUSPENSION (ML) NASAL
COMMUNITY
Start: 2021-07-26

## 2021-10-07 RX ORDER — RIZATRIPTAN BENZOATE 10 MG/1
TABLET, ORALLY DISINTEGRATING ORAL
COMMUNITY
Start: 2021-09-21 | End: 2021-10-07

## 2021-10-07 RX ORDER — METHOCARBAMOL 750 MG/1
TABLET, FILM COATED ORAL
COMMUNITY

## 2021-10-07 RX ORDER — OXYCODONE AND ACETAMINOPHEN 7.5; 325 MG/1; MG/1
TABLET ORAL
COMMUNITY

## 2021-10-11 ENCOUNTER — OFFICE VISIT (OUTPATIENT)
Dept: FAMILY MEDICINE CLINIC | Facility: CLINIC | Age: 46
End: 2021-10-11

## 2021-10-11 VITALS
DIASTOLIC BLOOD PRESSURE: 78 MMHG | WEIGHT: 275.2 LBS | OXYGEN SATURATION: 99 % | HEART RATE: 73 BPM | HEIGHT: 67 IN | TEMPERATURE: 98.5 F | BODY MASS INDEX: 43.19 KG/M2 | SYSTOLIC BLOOD PRESSURE: 128 MMHG

## 2021-10-11 DIAGNOSIS — I10 BENIGN ESSENTIAL HYPERTENSION: Primary | ICD-10-CM

## 2021-10-11 DIAGNOSIS — M54.2 CHRONIC NECK PAIN: ICD-10-CM

## 2021-10-11 DIAGNOSIS — G43.709 CHRONIC MIGRAINE WITHOUT AURA WITHOUT STATUS MIGRAINOSUS, NOT INTRACTABLE: ICD-10-CM

## 2021-10-11 DIAGNOSIS — E78.2 MIXED HYPERLIPIDEMIA: ICD-10-CM

## 2021-10-11 DIAGNOSIS — G89.29 CHRONIC NECK PAIN: ICD-10-CM

## 2021-10-11 PROCEDURE — 99214 OFFICE O/P EST MOD 30 MIN: CPT | Performed by: NURSE PRACTITIONER

## 2021-10-11 RX ORDER — PROPRANOLOL HYDROCHLORIDE 40 MG/1
40 TABLET ORAL 2 TIMES DAILY
Qty: 180 TABLET | Refills: 1 | Status: SHIPPED | OUTPATIENT
Start: 2021-10-11 | End: 2022-04-12 | Stop reason: SDUPTHER

## 2021-10-11 RX ORDER — FESOTERODINE FUMARATE 8 MG/1
8 TABLET, FILM COATED, EXTENDED RELEASE ORAL
Qty: 30 TABLET | Refills: 5 | Status: SHIPPED | OUTPATIENT
Start: 2021-10-11 | End: 2022-04-11

## 2021-10-11 RX ORDER — RIZATRIPTAN BENZOATE 10 MG/1
10 TABLET, ORALLY DISINTEGRATING ORAL ONCE AS NEEDED
Qty: 9 TABLET | Refills: 5 | Status: SHIPPED | OUTPATIENT
Start: 2021-10-11 | End: 2021-11-17

## 2021-10-11 RX ORDER — CETIRIZINE HYDROCHLORIDE 10 MG/1
10 TABLET ORAL DAILY
Qty: 90 TABLET | Refills: 3 | Status: SHIPPED | OUTPATIENT
Start: 2021-10-11 | End: 2021-10-18

## 2021-10-11 RX ORDER — IBUPROFEN 800 MG/1
800 TABLET ORAL EVERY 8 HOURS PRN
Qty: 90 TABLET | Refills: 5 | Status: SHIPPED | OUTPATIENT
Start: 2021-10-11 | End: 2022-04-06

## 2021-10-11 RX ORDER — RIMEGEPANT SULFATE 75 MG/75MG
75 TABLET, ORALLY DISINTEGRATING ORAL DAILY PRN
Qty: 8 TABLET | Refills: 5 | Status: SHIPPED | OUTPATIENT
Start: 2021-10-11 | End: 2021-12-10

## 2021-10-11 RX ORDER — FENOFIBRATE 40 MG/1
80 TABLET ORAL DAILY
Qty: 60 EACH | Refills: 5 | Status: SHIPPED | OUTPATIENT
Start: 2021-10-11 | End: 2021-11-10 | Stop reason: CLARIF

## 2021-10-11 NOTE — PROGRESS NOTES
"Chief Complaint  Hypertension    Subjective          Debi Schroeder presents to [facility]  History of Present Illness  She presents today for 6-month follow-up.  She had her labs drawn on 9/30/2021.    Borderline diabetes: She is stable with an A1c of 5.84%.    Hypertension: Her blood pressure stable 130/78 on propranolol 40 mg twice daily.    Overactive bladder: She states the new medication Toviaz 4 mg is helping but she is still having issues with her bladder.    Mixed hyperlipidemia: Her triglyceride levels have increased to 291, HDL 27, , total cholesterol 188.  She is currently on fenofibrate 40 mg daily.    Migraines: She has been having more migraines lately.  She has been using rizatriptan often and sometimes runs out of medication.  She states she does have a lot of neck pain and has been getting injections in her neck.  She takes ibuprofen as needed for neck pain but this is increasing her migraines.  She is following with pain management and is on Percocet and Robaxin.  It is noted that she has Zanaflex also on her med list but she states she does not take that at the same time as Robaxin.  Objective   Vital Signs:   /78   Pulse 73   Temp 98.5 °F (36.9 °C)   Ht 170.2 cm (67\")   Wt 125 kg (275 lb 3.2 oz)   SpO2 99%   BMI 43.10 kg/m²     Physical Exam  Vitals reviewed.   Constitutional:       Appearance: Normal appearance. She is well-developed.   Neck:      Thyroid: No thyroid mass, thyromegaly or thyroid tenderness.   Cardiovascular:      Rate and Rhythm: Normal rate and regular rhythm.      Heart sounds: No murmur heard.  No friction rub. No gallop.    Pulmonary:      Effort: Pulmonary effort is normal.      Breath sounds: Normal breath sounds. No wheezing or rhonchi.   Lymphadenopathy:      Cervical: No cervical adenopathy.   Skin:     General: Skin is warm and dry.   Neurological:      Mental Status: She is alert and oriented to person, place, and " time.      Cranial Nerves: No cranial nerve deficit.   Psychiatric:         Mood and Affect: Mood and affect normal.         Behavior: Behavior normal.         Thought Content: Thought content normal. Thought content does not include homicidal or suicidal ideation.         Judgment: Judgment normal.        Result Review :                 Assessment and Plan    Diagnoses and all orders for this visit:    1. Benign essential hypertension (Primary)  Assessment & Plan:  Hypertension is improving with treatment.  Continue current treatment regimen.  Continue current medications.  Blood pressure will be reassessed at the next regular appointment.      2. Chronic migraine without aura without status migrainosus, not intractable  Assessment & Plan:  Headaches are worsening.  Medication changes per orders.  I will start her on Nurtec to take as needed, counseled on proper administration.  Advised that she can still take Maxalt if needed.  I will also get her referred to neurology.        Orders:  -     Ambulatory Referral to Neurology    3. Chronic neck pain  Assessment & Plan:  Advised to take ibuprofen as needed.  Advised discuss further with pain management.      4. Mixed hyperlipidemia  Assessment & Plan:  Lipid abnormalities are worsening.  Pharmacotherapy as ordered. and Will increase fenofibrate to 80 mg daily.  Lipids will be reassessed in 3 months.      Other orders  -     fesoterodine fumarate (Toviaz) 8 MG tablet sustained-release 24 hour tablet; Take 1 tablet by mouth Daily.  Dispense: 30 tablet; Refill: 5  -     cetirizine (zyrTEC) 10 MG tablet; Take 1 tablet by mouth Daily.  Dispense: 90 tablet; Refill: 3  -     fenofibrate (FENOGLIDE) 40 MG tablet; Take 2 tablets by mouth Daily.  Dispense: 60 each; Refill: 5  -     propranolol (INDERAL) 40 MG tablet; Take 1 tablet by mouth 2 (Two) Times a Day.  Dispense: 180 tablet; Refill: 1  -     ibuprofen (ADVIL,MOTRIN) 800 MG tablet; Take 1 tablet by mouth Every 8 (Eight)  Hours As Needed for Mild Pain .  Dispense: 90 tablet; Refill: 5  -     rizatriptan MLT (MAXALT-MLT) 10 MG disintegrating tablet; Place 1 tablet on the tongue 1 (One) Time As Needed for Migraine for up to 1 dose. May repeat in 2 hours if needed  Dispense: 9 tablet; Refill: 5  -     Rimegepant Sulfate (Nurtec) 75 MG tablet dispersible tablet; Take 1 tablet by mouth Daily As Needed (migraine).  Dispense: 8 tablet; Refill: 5      Follow Up   Return in about 3 months (around 1/11/2022) for Next scheduled follow up.  Patient was given instructions and counseling regarding her condition or for health maintenance advice. Please see specific information pulled into the AVS if appropriate.

## 2021-10-11 NOTE — ASSESSMENT & PLAN NOTE
Headaches are worsening.  Medication changes per orders.  I will start her on Nurtec to take as needed, counseled on proper administration.  Advised that she can still take Maxalt if needed.  I will also get her referred to neurology.

## 2021-10-11 NOTE — ASSESSMENT & PLAN NOTE
Lipid abnormalities are worsening.  Pharmacotherapy as ordered. and Will increase fenofibrate to 80 mg daily.  Lipids will be reassessed in 3 months.

## 2021-10-18 RX ORDER — CETIRIZINE HYDROCHLORIDE 10 MG/1
TABLET ORAL
Qty: 30 TABLET | Refills: 0 | Status: SHIPPED | OUTPATIENT
Start: 2021-10-18 | End: 2022-04-12 | Stop reason: SDUPTHER

## 2021-11-10 RX ORDER — FENOFIBRATE 48 MG/1
96 TABLET, COATED ORAL DAILY
Qty: 60 TABLET | Refills: 5 | Status: SHIPPED | OUTPATIENT
Start: 2021-11-10 | End: 2022-01-11 | Stop reason: SDUPTHER

## 2021-11-15 ENCOUNTER — OFFICE VISIT (OUTPATIENT)
Dept: NEUROLOGY | Facility: CLINIC | Age: 46
End: 2021-11-15

## 2021-11-15 ENCOUNTER — PRIOR AUTHORIZATION (OUTPATIENT)
Dept: NEUROLOGY | Facility: CLINIC | Age: 46
End: 2021-11-15

## 2021-11-15 VITALS
BODY MASS INDEX: 43.4 KG/M2 | DIASTOLIC BLOOD PRESSURE: 92 MMHG | SYSTOLIC BLOOD PRESSURE: 151 MMHG | HEART RATE: 89 BPM | HEIGHT: 67 IN | WEIGHT: 276.5 LBS

## 2021-11-15 DIAGNOSIS — M50.30 DDD (DEGENERATIVE DISC DISEASE), CERVICAL: ICD-10-CM

## 2021-11-15 DIAGNOSIS — M48.02 SPINAL STENOSIS OF CERVICAL REGION: ICD-10-CM

## 2021-11-15 DIAGNOSIS — G43.019 INTRACTABLE MIGRAINE WITHOUT AURA AND WITHOUT STATUS MIGRAINOSUS: Primary | ICD-10-CM

## 2021-11-15 PROCEDURE — 99215 OFFICE O/P EST HI 40 MIN: CPT | Performed by: NURSE PRACTITIONER

## 2021-11-15 RX ORDER — SUMATRIPTAN 50 MG/1
50 TABLET, FILM COATED ORAL ONCE AS NEEDED
Qty: 9 TABLET | Refills: 2 | Status: SHIPPED | OUTPATIENT
Start: 2021-11-15 | End: 2022-01-11

## 2021-11-15 NOTE — PROGRESS NOTES
"Chief Complaint  Migraine    Jose Schroeder presents to [facility]  She reports that she developed headaches many years ago. Since that time, her headaches have progressively worsened.   Currently, she reports headaches that are located occipital . She characterizes the headaches as 7/10 in severity, piercing, throbbing , sharp, stabbing and dull in nature with associated photophobia, phonophobia and nausea. She reports headaches last 8+ hours. She reports 30 headache days per month. She denies associated aura. She denies focal numbness, weakness, speech and vision changes.   Triggers: denies any known triggers   Symptoms improved by: Nothing  She states she is sleeping fairly well. Reports getting 5-7 hours of sleep per night. Endorses snoring. Reports unrefreshing sleep.   Prior prophylactic medications include: propranolol, amitriptyline, topiramate/zonisamide contraindicated d/t kidney stones  She  uses abortive therapy such as: maxalt,   Caffeine Use: 2 servings daily  Childbearing potential : IUD  History of Kidney Stones: Yes  She denies a family history of cerebral aneurysm.       Objective   Vital Signs:   /92   Pulse 89   Ht 170.2 cm (67\")   Wt 125 kg (276 lb 8 oz)   BMI 43.31 kg/m²     Physical Exam  HENT:      Head: Normocephalic.   Pulmonary:      Effort: Pulmonary effort is normal.   Neurological:      Mental Status: She is alert and oriented to person, place, and time.      Cranial Nerves: Cranial nerves are intact.      Sensory: Sensation is intact.      Motor: Motor function is intact.      Coordination: Coordination is intact.      Deep Tendon Reflexes: Reflexes are normal and symmetric.        Neurologic Exam     Mental Status   Oriented to person, place, and time.        Result Review :             Cervical Spine  MRI:   1. Multilevel cervical spondylosis, most prominent at C5-C6, as detailed above.    2. At C5-C6, there is " mild spinal canal stenosis, abutment of the anterior right side of the     cervical cord, partial effacement of each lateral recess, moderate to severe right neural foraminal     narrowing, and moderate left neural foraminal narrowing.    3. Otherwise, no significant spinal canal stenosis or neural foraminal narrowing.    4. Heterogeneous bone marrow signal could reflect red marrow reconversion.  Recommend correlating     with CBC.      5. Incompletely evaluated cystic lesion in the right lobe of the thyroid gland.  Could consider     further evaluation with ultrasound if clinically indicated.    Assessment and Plan    Diagnoses and all orders for this visit:    1. Intractable migraine without aura and without status migrainosus (Primary)    2. DDD (degenerative disc disease), cervical  -     MRI Cervical Spine Without Contrast; Future  -     Ambulatory Referral to Neurosurgery    3. Spinal stenosis of cervical region  Assessment & Plan:  Neck pain is definitely contributing to migraines.  Will order updated C-Spine MRI and refer to KELLY for consult.       Other orders  -     SUMAtriptan (Imitrex) 50 MG tablet; Take 1 tablet by mouth 1 (One) Time As Needed for Migraine. Take one tablet at onset of headache. May repeat dose one time in 2 hours if headache not relieved.  Dispense: 9 tablet; Refill: 2    I spent 45 minutes caring for Debi on this date of service. This time includes time spent by me in the following activities:preparing for the visit, reviewing tests, obtaining and/or reviewing a separately obtained history, performing a medically appropriate examination and/or evaluation , counseling and educating the patient/family/caregiver, ordering medications, tests, or procedures, documenting information in the medical record and independently interpreting results and communicating that information with the patient/family/caregiver  Follow Up   No follow-ups on file.  Patient was given instructions and counseling  regarding her condition or for health maintenance advice. Please see specific information pulled into the AVS if appropriate.

## 2021-11-15 NOTE — ASSESSMENT & PLAN NOTE
Neck pain is definitely contributing to migraines.  Will order updated C-Spine MRI and refer to KELLY for consult.

## 2021-11-17 RX ORDER — RIZATRIPTAN BENZOATE 10 MG/1
TABLET, ORALLY DISINTEGRATING ORAL
Qty: 9 TABLET | Refills: 5 | Status: SHIPPED | OUTPATIENT
Start: 2021-11-17 | End: 2022-01-11 | Stop reason: SDUPTHER

## 2021-12-07 ENCOUNTER — HOSPITAL ENCOUNTER (OUTPATIENT)
Dept: MRI IMAGING | Facility: HOSPITAL | Age: 46
Discharge: HOME | End: 2021-12-07
Admitting: NURSE PRACTITIONER

## 2021-12-07 DIAGNOSIS — M50.30 DDD (DEGENERATIVE DISC DISEASE), CERVICAL: ICD-10-CM

## 2021-12-07 PROCEDURE — 72141 MRI NECK SPINE W/O DYE: CPT

## 2021-12-10 ENCOUNTER — PROCEDURE VISIT (OUTPATIENT)
Dept: NEUROLOGY | Facility: CLINIC | Age: 46
End: 2021-12-10

## 2021-12-10 DIAGNOSIS — G43.019 INTRACTABLE MIGRAINE WITHOUT AURA AND WITHOUT STATUS MIGRAINOSUS: Primary | ICD-10-CM

## 2021-12-10 PROCEDURE — 64615 CHEMODENERV MUSC MIGRAINE: CPT | Performed by: NURSE PRACTITIONER

## 2021-12-10 NOTE — PATIENT INSTRUCTIONS
OnabotulinumtoxinA injection (Medical Use)  What is this medicine?  ONABOTULINUMTOXINA (o na AYSHA you lye num tox in ) is a neuro-muscular blocker. This medicine is used to treat crossed eyes, eyelid spasms, severe neck muscle spasms, ankle and toe muscle spasms, and elbow, wrist, and finger muscle spasms. It is also used to treat excessive underarm sweating, to prevent chronic migraine headaches, and to treat loss of bladder control due to neurologic conditions such as multiple sclerosis or spinal cord injury.  This medicine may be used for other purposes; ask your health care provider or pharmacist if you have questions.  COMMON BRAND NAME(S): Botox  What should I tell my health care provider before I take this medicine?  They need to know if you have any of these conditions:  · breathing problems  · cerebral palsy spasms  · difficulty urinating  · heart problems  · history of surgery where this medicine is going to be used  · infection at the site where this medicine is going to be used  · myasthenia gravis or other neurologic disease  · nerve or muscle disease  · surgery plans  · take medicines that treat or prevent blood clots  · thyroid problems  · an unusual or allergic reaction to botulinum toxin, albumin, other medicines, foods, dyes, or preservatives  · pregnant or trying to get pregnant  · breast-feeding  How should I use this medicine?  This medicine is for injection into a muscle. It is given by a health care professional in a hospital or clinic setting.  Talk to your pediatrician regarding the use of this medicine in children. While this drug may be prescribed for children as young as 2 years old for selected conditions, precautions do apply.  Overdosage: If you think you have taken too much of this medicine contact a poison control center or emergency room at once.  NOTE: This medicine is only for you. Do not share this medicine with others.  What if I miss a dose?  This does not apply.  What may  interact with this medicine?  · aminoglycoside antibiotics like gentamicin, neomycin, tobramycin  · muscle relaxants  · other botulinum toxin injections  This list may not describe all possible interactions. Give your health care provider a list of all the medicines, herbs, non-prescription drugs, or dietary supplements you use. Also tell them if you smoke, drink alcohol, or use illegal drugs. Some items may interact with your medicine.  What should I watch for while using this medicine?  Visit your doctor for regular check ups.  This medicine will cause weakness in the muscle where it is injected. Tell your doctor if you feel unusually weak in other muscles. Get medical help right away if you have problems with breathing, swallowing, or talking.  This medicine might make your eyelids droop or make you see blurry or double. If you have weak muscles or trouble seeing do not drive a car, use machinery, or do other dangerous activities.  This medicine contains albumin from human blood. It may be possible to pass an infection in this medicine, but no cases have been reported. Talk to your doctor about the risks and benefits of this medicine.  If your activities have been limited by your condition, go back to your regular routine slowly after treatment with this medicine.  What side effects may I notice from receiving this medicine?  Side effects that you should report to your doctor or health care professional as soon as possible:  · allergic reactions like skin rash, itching or hives, swelling of the face, lips, or tongue  · breathing problems  · changes in vision  · chest pain or tightness  · eye irritation, pain  · fast, irregular heartbeat  · infection  · numbness  · speech problems  · swallowing problems  · unusual weakness  Side effects that usually do not require medical attention (report to your doctor or health care professional if they continue or are bothersome):  · bruising or pain at site where  injected  · drooping eyelid  · dry eyes or mouth  · headache  · muscles aches, pains  · sensitivity to light  · tearing  This list may not describe all possible side effects. Call your doctor for medical advice about side effects. You may report side effects to FDA at 0-602-CRU-2132.  Where should I keep my medicine?  This drug is given in a hospital or clinic and will not be stored at home.  NOTE: This sheet is a summary. It may not cover all possible information. If you have questions about this medicine, talk to your doctor, pharmacist, or health care provider.  © 2021 [employer]/Gold Standard (2019-06-24 14:21:42)

## 2021-12-10 NOTE — PROGRESS NOTES
CC: Botox Injections  Indication for Procedure: Chronic migraines          There were no vitals taken for this visit.     With written consent obtained and risks and benefits explained to patient.     Botox injected using FDA approved protocol for chronic migraine prevention.   10 units, Procerus 5 units, Frontalis 20 units, Temporalis 40 units, Occipitalis 30 units, Cervical Paraspinals 20 units, Trapezius 30 units.     The total amount injected in units is 155.  The total amount wasted in units is 45.  The total amount submitted in units is 200.  Botox was supplied by physician.    Patient tolerated procedure well with no immediate complications.     We have discussed risk and benefits of this Botox procedure and common side effects including headache, neck pain, neck stiffness or weakness, ptosis, flu-like symptoms as well as more serious possible adverse effects including possible dysphagia, respiratory distress or even death (death has only been reported once with adults for Botox for migraines in another state when mixed with lidocaine solution which we do not use lidocaine solution in our practice for mixing Botox). Verbalizes understanding, accepts risks and agrees with moving forward with Botox injections for chronic migraine prevention..

## 2022-01-04 ENCOUNTER — LAB (OUTPATIENT)
Dept: FAMILY MEDICINE CLINIC | Facility: CLINIC | Age: 47
End: 2022-01-04

## 2022-01-04 DIAGNOSIS — E78.2 MIXED HYPERLIPIDEMIA: ICD-10-CM

## 2022-01-04 LAB
ALBUMIN SERPL-MCNC: 4.5 G/DL (ref 3.5–5.2)
ALBUMIN/GLOB SERPL: 1.6 G/DL
ALP SERPL-CCNC: 75 U/L (ref 39–117)
ALT SERPL W P-5'-P-CCNC: 18 U/L (ref 1–33)
ANION GAP SERPL CALCULATED.3IONS-SCNC: 10.9 MMOL/L (ref 5–15)
AST SERPL-CCNC: 23 U/L (ref 1–32)
BILIRUB SERPL-MCNC: <0.2 MG/DL (ref 0–1.2)
BUN SERPL-MCNC: 19 MG/DL (ref 6–20)
BUN/CREAT SERPL: 22.6 (ref 7–25)
CALCIUM SPEC-SCNC: 9.4 MG/DL (ref 8.6–10.5)
CHLORIDE SERPL-SCNC: 107 MMOL/L (ref 98–107)
CHOLEST SERPL-MCNC: 161 MG/DL (ref 0–200)
CO2 SERPL-SCNC: 22.1 MMOL/L (ref 22–29)
CREAT SERPL-MCNC: 0.84 MG/DL (ref 0.57–1)
GFR SERPL CREATININE-BSD FRML MDRD: 74 ML/MIN/1.73
GLOBULIN UR ELPH-MCNC: 2.8 GM/DL
GLUCOSE SERPL-MCNC: 116 MG/DL (ref 65–99)
HDLC SERPL-MCNC: 28 MG/DL (ref 40–60)
LDLC SERPL CALC-MCNC: 84 MG/DL (ref 0–100)
LDLC/HDLC SERPL: 2.64 {RATIO}
POTASSIUM SERPL-SCNC: 4.1 MMOL/L (ref 3.5–5.2)
PROT SERPL-MCNC: 7.3 G/DL (ref 6–8.5)
SODIUM SERPL-SCNC: 140 MMOL/L (ref 136–145)
TRIGL SERPL-MCNC: 295 MG/DL (ref 0–150)
VLDLC SERPL-MCNC: 49 MG/DL (ref 5–40)

## 2022-01-04 PROCEDURE — 36415 COLL VENOUS BLD VENIPUNCTURE: CPT | Performed by: NURSE PRACTITIONER

## 2022-01-04 PROCEDURE — 80053 COMPREHEN METABOLIC PANEL: CPT | Performed by: NURSE PRACTITIONER

## 2022-01-04 PROCEDURE — 80061 LIPID PANEL: CPT | Performed by: NURSE PRACTITIONER

## 2022-01-11 ENCOUNTER — OFFICE VISIT (OUTPATIENT)
Dept: FAMILY MEDICINE CLINIC | Facility: CLINIC | Age: 47
End: 2022-01-11

## 2022-01-11 VITALS
SYSTOLIC BLOOD PRESSURE: 140 MMHG | TEMPERATURE: 97.9 F | BODY MASS INDEX: 43.29 KG/M2 | HEART RATE: 80 BPM | HEIGHT: 67 IN | OXYGEN SATURATION: 96 % | WEIGHT: 275.8 LBS | DIASTOLIC BLOOD PRESSURE: 84 MMHG

## 2022-01-11 DIAGNOSIS — E78.2 MIXED HYPERLIPIDEMIA: ICD-10-CM

## 2022-01-11 DIAGNOSIS — R73.01 IMPAIRED FASTING GLUCOSE: ICD-10-CM

## 2022-01-11 DIAGNOSIS — G43.709 CHRONIC MIGRAINE WITHOUT AURA WITHOUT STATUS MIGRAINOSUS, NOT INTRACTABLE: Primary | ICD-10-CM

## 2022-01-11 DIAGNOSIS — N31.8 HYPERTONICITY OF BLADDER: ICD-10-CM

## 2022-01-11 PROCEDURE — 99214 OFFICE O/P EST MOD 30 MIN: CPT | Performed by: NURSE PRACTITIONER

## 2022-01-11 RX ORDER — ELETRIPTAN HYDROBROMIDE 40 MG/1
40 TABLET, FILM COATED ORAL ONCE AS NEEDED
Qty: 9 TABLET | Refills: 0 | Status: SHIPPED | OUTPATIENT
Start: 2022-01-11 | End: 2022-02-17

## 2022-01-11 RX ORDER — RIMEGEPANT SULFATE 75 MG/75MG
75 TABLET, ORALLY DISINTEGRATING ORAL DAILY PRN
Qty: 2 TABLET | Refills: 0 | COMMUNITY
Start: 2022-01-11 | End: 2022-04-12 | Stop reason: SDUPTHER

## 2022-01-11 RX ORDER — RIZATRIPTAN BENZOATE 10 MG/1
10 TABLET, ORALLY DISINTEGRATING ORAL DAILY PRN
Qty: 9 TABLET | Refills: 5 | Status: SHIPPED | OUTPATIENT
Start: 2022-01-11 | End: 2022-01-11

## 2022-01-11 RX ORDER — FENOFIBRATE 145 MG/1
145 TABLET, COATED ORAL DAILY
Qty: 30 TABLET | Refills: 5 | Status: SHIPPED | OUTPATIENT
Start: 2022-01-11 | End: 2022-04-12 | Stop reason: SDUPTHER

## 2022-01-11 NOTE — ASSESSMENT & PLAN NOTE
Lipid abnormalities are unchanged.  Pharmacotherapy as ordered. and I will increase fenofibrate dose to 160 mg due to triglycerides still being high.  Lipids will be reassessed in 3 months.

## 2022-01-11 NOTE — PROGRESS NOTES
"eChief Complaint  Bladder Problem and Follow-up    Subjective          Debi Schroeder presents to [facility]  History of Present Illness   46-year-old female presents today for 3-month follow-up.    Overactive bladder: She was started on Toviaz 8 mg and it has been helping but she is still having urinary frequency. She states Myrbetriq worked better but her insurance will not pay for it.    She was having worsening of headaches.  I referred her to neurology.  She is now getting Botox injections for her migraines.  She was given Imitrex to replace Maxalt but states she feels the Maxalt was working better and faster.  She is still waiting for the approval for Nurtec.  She has never got to try it.  We do have samples in the office today.  She states that the Botox injections were helping at first but seem to not be working now.  She received some trigger point injections in her neck through pain management and states that she has had a headache ever since.    Mixed hyperlipidemia: She did get her labs checked prior to her appointment.  Her triglycerides are still high, no improvement at 295.  Her LDL did improve slightly from 110 to 84.  Objective   Vital Signs:   /84   Pulse 80   Temp 97.9 °F (36.6 °C)   Ht 170.2 cm (67\")   Wt 125 kg (275 lb 12.8 oz)   SpO2 96%   BMI 43.20 kg/m²     Physical Exam  Vitals reviewed.   Constitutional:       Appearance: Normal appearance. She is well-developed.   Neck:      Thyroid: No thyroid mass, thyromegaly or thyroid tenderness.   Cardiovascular:      Rate and Rhythm: Normal rate and regular rhythm.      Heart sounds: No murmur heard.  No friction rub. No gallop.    Pulmonary:      Effort: Pulmonary effort is normal.      Breath sounds: Normal breath sounds. No wheezing or rhonchi.   Lymphadenopathy:      Cervical: No cervical adenopathy.   Skin:     General: Skin is warm and dry.   Neurological:      Mental Status: She is alert and " oriented to person, place, and time.      Cranial Nerves: No cranial nerve deficit.   Psychiatric:         Mood and Affect: Mood and affect normal.         Behavior: Behavior normal.         Thought Content: Thought content normal. Thought content does not include homicidal or suicidal ideation.         Judgment: Judgment normal.        Result Review :     CMP    CMP 4/5/21 9/30/21 1/4/22   Glucose 89 103 (A) 116 (A)   BUN 17 18 19   Creatinine 0.75 0.89 0.84   eGFR Non African Am  69 74   Sodium 136 138 140   Potassium 4.1 4.4 4.1   Chloride 102 106 107   Calcium 9.3 9.3 9.4   Albumin 4.1 4.30 4.50   Total Bilirubin 0.27 0.2 <0.2   Alkaline Phosphatase 109 (A) 99 75   AST (SGOT) 22 25 23   ALT (SGPT) 19 22 18   (A) Abnormal value            Lipid Panel    Lipid Panel 4/5/21 9/30/21 1/4/22   Total Cholesterol  188 161   Total Cholesterol 152     Triglycerides 226 (A) 291 (A) 295 (A)   HDL Cholesterol 32 (A) 27 (A) 28 (A)   VLDL Cholesterol 45 (A) 51 (A) 49 (A)   LDL Cholesterol  75 110 (A) 84   LDL/HDL Ratio  3.81 2.64   (A) Abnormal value       Comments are available for some flowsheets but are not being displayed.                   Assessment and Plan    Diagnoses and all orders for this visit:    1. Chronic migraine without aura without status migrainosus, not intractable (Primary)  Assessment & Plan:  Headaches are worsening.  Medication changes per orders.  Follow up in 3 months, or sooner should new symptoms or problems arise.  I will change her back to Maxalt and have her stop Imitrex since its not as effective.  Nurtec samples given in office today and patient instructed on proper administration of the medication.  She is still waiting for prior authorization from neurology for Nurtec.          2. Hypertonicity of bladder  Assessment & Plan:  Overactive bladder has improved with Toviaz but still having some urinary frequency.  Patient will continue Toviaz 8 mg daily.  She will follow-up in 3 months.      3.  Mixed hyperlipidemia  Assessment & Plan:  Lipid abnormalities are unchanged.  Pharmacotherapy as ordered. and I will increase fenofibrate dose to 160 mg due to triglycerides still being high.  Lipids will be reassessed in 3 months.    Orders:  -     Comprehensive Metabolic Panel; Future  -     Lipid Panel; Future    4. Impaired fasting glucose  Assessment & Plan:  We will check an A1c with her next labs in 3 months.    Orders:  -     Hemoglobin A1c; Future    Other orders  -     rizatriptan MLT (MAXALT-MLT) 10 MG disintegrating tablet; Place 1 tablet on the tongue Daily As Needed for Migraine. May repeat in 2 hours if needed  Dispense: 9 tablet; Refill: 5  -     fenofibrate (TRICOR) 145 MG tablet; Take 1 tablet by mouth Daily.  Dispense: 30 tablet; Refill: 5  -     Rimegepant Sulfate (Nurtec) 75 MG tablet dispersible tablet; Take 1 tablet by mouth Daily As Needed (migraine).  Dispense: 2 tablet; Refill: 0      Follow Up   Return in about 3 months (around 4/11/2022) for Next scheduled follow up.  Patient was given instructions and counseling regarding her condition or for health maintenance advice. Please see specific information pulled into the AVS if appropriate.

## 2022-01-11 NOTE — ASSESSMENT & PLAN NOTE
Headaches are worsening.  Medication changes per orders.  Follow up in 3 months, or sooner should new symptoms or problems arise.  I will change her back to Maxalt and have her stop Imitrex since its not as effective.  Nurtec samples given in office today and patient instructed on proper administration of the medication.  She is still waiting for prior authorization from neurology for Nurtec.

## 2022-01-11 NOTE — PATIENT INSTRUCTIONS
Dyslipidemia  Dyslipidemia is an imbalance of waxy, fat-like substances (lipids) in the blood. The body needs lipids in small amounts. Dyslipidemia often involves a high level of cholesterol or triglycerides, which are types of lipids.  Common forms of dyslipidemia include:  · High levels of LDL cholesterol. LDL is the type of cholesterol that causes fatty deposits (plaques) to build up in the blood vessels that carry blood away from your heart (arteries).  · Low levels of HDL cholesterol. HDL cholesterol is the type of cholesterol that protects against heart disease. High levels of HDL remove the LDL buildup from arteries.  · High levels of triglycerides. Triglycerides are a fatty substance in the blood that is linked to a buildup of plaques in the arteries.  What are the causes?  Primary dyslipidemia is caused by changes (mutations) in genes that are passed down through families (inherited). These mutations cause several types of dyslipidemia.  Secondary dyslipidemia is caused by lifestyle choices and diseases that lead to dyslipidemia, such as:  · Eating a diet that is high in animal fat.  · Not getting enough exercise.  · Having diabetes, kidney disease, liver disease, or thyroid disease.  · Drinking large amounts of alcohol.  · Using certain medicines.  What increases the risk?  You are more likely to develop this condition if you are an older man or if you are a woman who has gone through menopause. Other risk factors include:  · Having a family history of dyslipidemia.  · Taking certain medicines, including birth control pills, steroids, some diuretics, and beta-blockers.  · Smoking cigarettes.  · Eating a high-fat diet.  · Having certain medical conditions such as diabetes, polycystic ovary syndrome (PCOS), kidney disease, liver disease, or hypothyroidism.  · Not exercising regularly.  · Being overweight or obese with too much belly fat.  What are the signs or symptoms?  In most cases, dyslipidemia does not  usually cause any symptoms.  In severe cases, very high lipid levels can cause:  · Fatty bumps under the skin (xanthomas).  · White or gray ring around the black center (pupil) of the eye.  Very high triglyceride levels can cause inflammation of the pancreas (pancreatitis).  How is this diagnosed?  Your health care provider may diagnose dyslipidemia based on a routine blood test (fasting blood test). Because most people do not have symptoms of the condition, this blood testing (lipid profile) is done on adults age 20 and older and is repeated every 5 years. This test checks:  · Total cholesterol. This measures the total amount of cholesterol in your blood, including LDL cholesterol, HDL cholesterol, and triglycerides. A healthy number is below 200.  · LDL cholesterol. The target number for LDL cholesterol is different for each person, depending on individual risk factors. Ask your health care provider what your LDL cholesterol should be.  · HDL cholesterol. An HDL level of 60 or higher is best because it helps to protect against heart disease. A number below 40 for men or below 50 for women increases the risk for heart disease.  · Triglycerides. A healthy triglyceride number is below 150.  If your lipid profile is abnormal, your health care provider may do other blood tests.  How is this treated?  Treatment depends on the type of dyslipidemia that you have and your other risk factors for heart disease and stroke. Your health care provider will have a target range for your lipid levels based on this information.  For many people, this condition may be treated by lifestyle changes, such as diet and exercise. Your health care provider may recommend that you:  · Get regular exercise.  · Make changes to your diet.  · Quit smoking if you smoke.  If diet changes and exercise do not help you reach your goals, your health care provider may also prescribe medicine to lower lipids. The most commonly prescribed type of medicine  lowers your LDL cholesterol (statin drug). If you have a high triglyceride level, your provider may prescribe another type of drug (fibrate) or an omega-3 fish oil supplement, or both.  Follow these instructions at home:    Eating and drinking  · Follow instructions from your health care provider or dietitian about eating or drinking restrictions.  · Eat a healthy diet as told by your health care provider. This can help you reach and maintain a healthy weight, lower your LDL cholesterol, and raise your HDL cholesterol. This may include:  ? Limiting your calories, if you are overweight.  ? Eating more fruits, vegetables, whole grains, fish, and lean meats.  ? Limiting saturated fat, trans fat, and cholesterol.  · If you drink alcohol:  ? Limit how much you use.  ? Be aware of how much alcohol is in your drink. In the [place] one drink equals one 12 oz bottle of beer (355 mL), one 5 oz glass of wine (148 mL), or one 1½ oz glass of hard liquor (44 mL).  · Do not drink alcohol if:  ? Your health care provider tells you not to drink.  ? You are pregnant, may be pregnant, or are planning to become pregnant.  Activity  · Get regular exercise. Start an exercise and strength training program as told by your health care provider. Ask your health care provider what activities are safe for you. Your health care provider may recommend:  ? 30 minutes of aerobic activity 4-6 days a week. Brisk walking is an example of aerobic activity.  ? Strength training 2 days a week.  General instructions  · Do not use any products that contain nicotine or tobacco, such as cigarettes, e-cigarettes, and chewing tobacco. If you need help quitting, ask your health care provider.  · Take over-the-counter and prescription medicines only as told by your health care provider. This includes supplements.  · Keep all follow-up visits as told by your health care provider.  Contact a health care provider if:  · You are:  ? Having trouble sticking to your  exercise or diet plan.  ? Struggling to quit smoking or control your use of alcohol.  Summary  · Dyslipidemia often involves a high level of cholesterol or triglycerides, which are types of lipids.  · Treatment depends on the type of dyslipidemia that you have and your other risk factors for heart disease and stroke.  · For many people, treatment starts with lifestyle changes, such as diet and exercise.  · Your health care provider may prescribe medicine to lower lipids.  This information is not intended to replace advice given to you by your health care provider. Make sure you discuss any questions you have with your health care provider.  Document Revised: 08/12/2019 Document Reviewed: 07/19/2019  [email or web address] Patient Education © 2021 [employer]

## 2022-01-11 NOTE — ASSESSMENT & PLAN NOTE
Overactive bladder has improved with Toviaz but still having some urinary frequency.  Patient will continue Toviaz 8 mg daily.  She will follow-up in 3 months.

## 2022-01-14 RX ORDER — RIZATRIPTAN BENZOATE 10 MG/1
TABLET, ORALLY DISINTEGRATING ORAL
Qty: 9 TABLET | Refills: 0 | Status: SHIPPED | OUTPATIENT
Start: 2022-01-14 | End: 2022-02-17 | Stop reason: SDUPTHER

## 2022-01-24 ENCOUNTER — TELEPHONE (OUTPATIENT)
Dept: NEUROSURGERY | Facility: CLINIC | Age: 47
End: 2022-01-24

## 2022-01-24 NOTE — TELEPHONE ENCOUNTER
Caller: Debi Schroeder    Relationship to patient: Self    Best call back number:[phone]    Patient is needing:PT CALLED AND STATES SHE NEEDED TO RESCHEDULE HER APPT. TODAY-TRIED TO WARM TRANSFER AND PHONE [facility]-PLEASE ADVISE DUE TO SAME DAY CANCELLATION AND RESCHEDULE-THANK YOU

## 2022-02-17 ENCOUNTER — TELEMEDICINE (OUTPATIENT)
Dept: NEUROLOGY | Facility: CLINIC | Age: 47
End: 2022-02-17

## 2022-02-17 DIAGNOSIS — M50.30 DDD (DEGENERATIVE DISC DISEASE), CERVICAL: ICD-10-CM

## 2022-02-17 DIAGNOSIS — G43.719 INTRACTABLE CHRONIC MIGRAINE WITHOUT AURA AND WITHOUT STATUS MIGRAINOSUS: Primary | ICD-10-CM

## 2022-02-17 PROCEDURE — 99214 OFFICE O/P EST MOD 30 MIN: CPT | Performed by: NURSE PRACTITIONER

## 2022-02-17 RX ORDER — RIZATRIPTAN BENZOATE 10 MG/1
10 TABLET, ORALLY DISINTEGRATING ORAL ONCE AS NEEDED
Qty: 9 TABLET | Refills: 3 | Status: SHIPPED | OUTPATIENT
Start: 2022-02-17 | End: 2022-06-06 | Stop reason: SDUPTHER

## 2022-02-17 RX ORDER — RIMEGEPANT SULFATE 75 MG/75MG
75 TABLET, ORALLY DISINTEGRATING ORAL DAILY PRN
Qty: 8 TABLET | Refills: 3 | Status: SHIPPED | OUTPATIENT
Start: 2022-02-17

## 2022-02-17 NOTE — ASSESSMENT & PLAN NOTE
Continue botox for preventative therapy of migraine and start Nurtec PRN for abortive therapy.     Botox will be administered per accepted algorithm for chronic migraine with a total of 155 units given divided as follows: 10 units in the , 5 units in the procerus, 20 units in the frontalis, 40 units in the temporalis, 30 units in the occipitalis, 20 units in the cervical paraspinals and 30 units in the trapezius.  This therapy will be given every 12 weeks.

## 2022-02-17 NOTE — PROGRESS NOTES
Chief Complaint  Migraine (botox due March)    Subjective          Debi Schroeder presents to [facility]  She presents to the office for Botox follow-up.  Last Botox injection was on 12/10/21.  States she is having 20 headache days per month.  Feels that she has seen improvement in migraines with Botox for preventative therapy.  Uses  Maxalt with good effect, but sometimes experiences drowsiness.     Previous abortive migraine medications: imitrex, maxalt, relpax,         Objective   Vital Signs:   There were no vitals taken for this visit.    Physical Exam  HENT:      Head: Normocephalic.   Pulmonary:      Effort: Pulmonary effort is normal.   Neurological:      Mental Status: She is alert and oriented to person, place, and time.      Cranial Nerves: Cranial nerves are intact.      Sensory: Sensation is intact.      Motor: Motor function is intact.      Coordination: Coordination is intact.      Deep Tendon Reflexes: Reflexes are normal and symmetric.        Neurologic Exam     Mental Status   Oriented to person, place, and time.        Result Review :               Assessment and Plan    Diagnoses and all orders for this visit:    1. Intractable chronic migraine without aura and without status migrainosus (Primary)  Assessment & Plan:  Continue botox for preventative therapy of migraine and start Nurtec PRN for abortive therapy.     Botox will be administered per accepted algorithm for chronic migraine with a total of 155 units given divided as follows: 10 units in the , 5 units in the procerus, 20 units in the frontalis, 40 units in the temporalis, 30 units in the occipitalis, 20 units in the cervical paraspinals and 30 units in the trapezius.  This therapy will be given every 12 weeks.             2. DDD (degenerative disc disease), cervical  Assessment & Plan:  Seeing neurosurgery Monday       Other orders  -     rizatriptan MLT (MAXALT-MLT) 10 MG  disintegrating tablet; Place 1 tablet on the tongue 1 (One) Time As Needed for Migraine for up to 1 dose. May repeat in 2 hours if needed  Dispense: 9 tablet; Refill: 3  -     Rimegepant Sulfate (Nurtec) 75 MG tablet dispersible tablet; Take 1 tablet by mouth Daily As Needed (migraine).  Dispense: 8 tablet; Refill: 3      Follow Up   Return in 22 days (on 3/11/2022) for Botox .  Patient was given instructions and counseling regarding her condition or for health maintenance advice. Please see specific information pulled into the AVS if appropriate.

## 2022-02-21 ENCOUNTER — OFFICE VISIT (OUTPATIENT)
Dept: NEUROSURGERY | Facility: CLINIC | Age: 47
End: 2022-02-21

## 2022-02-21 VITALS — WEIGHT: 275 LBS | HEIGHT: 67 IN | BODY MASS INDEX: 43.16 KG/M2

## 2022-02-21 DIAGNOSIS — M54.2 CERVICALGIA: ICD-10-CM

## 2022-02-21 DIAGNOSIS — M47.812 CERVICAL SPONDYLOSIS WITHOUT MYELOPATHY: Primary | ICD-10-CM

## 2022-02-21 DIAGNOSIS — M48.02 SPINAL STENOSIS IN CERVICAL REGION: ICD-10-CM

## 2022-02-21 PROCEDURE — 99214 OFFICE O/P EST MOD 30 MIN: CPT | Performed by: NURSE PRACTITIONER

## 2022-02-21 RX ORDER — SUMATRIPTAN 50 MG/1
TABLET, FILM COATED ORAL
COMMUNITY
Start: 2022-02-18 | End: 2022-04-12
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
## 2022-02-21 NOTE — PATIENT INSTRUCTIONS
-Smoking cessation  -Pain management for medial branch block and cervical RFA  -Follow up as needed

## 2022-03-07 ENCOUNTER — PRIOR AUTHORIZATION (OUTPATIENT)
Dept: NEUROLOGY | Facility: CLINIC | Age: 47
End: 2022-03-07

## 2022-03-07 PROBLEM — G43.719 INTRACTABLE CHRONIC MIGRAINE WITHOUT AURA AND WITHOUT STATUS MIGRAINOSUS: Status: ACTIVE | Noted: 2021-11-15

## 2022-03-07 PROBLEM — G43.909 MIGRAINES: Status: RESOLVED | Noted: 2021-10-07 | Resolved: 2022-03-07

## 2022-03-11 ENCOUNTER — PROCEDURE VISIT (OUTPATIENT)
Dept: NEUROLOGY | Facility: CLINIC | Age: 47
End: 2022-03-11

## 2022-03-11 DIAGNOSIS — G43.719 INTRACTABLE CHRONIC MIGRAINE WITHOUT AURA AND WITHOUT STATUS MIGRAINOSUS: Primary | ICD-10-CM

## 2022-03-11 PROCEDURE — 64615 CHEMODENERV MUSC MIGRAINE: CPT | Performed by: NURSE PRACTITIONER

## 2022-04-06 RX ORDER — IBUPROFEN 800 MG/1
TABLET ORAL
Qty: 90 TABLET | Refills: 5 | Status: SHIPPED | OUTPATIENT
Start: 2022-04-06

## 2022-04-08 ENCOUNTER — LAB (OUTPATIENT)
Dept: FAMILY MEDICINE CLINIC | Facility: CLINIC | Age: 47
End: 2022-04-08

## 2022-04-08 DIAGNOSIS — E78.2 MIXED HYPERLIPIDEMIA: ICD-10-CM

## 2022-04-08 DIAGNOSIS — R73.01 IMPAIRED FASTING GLUCOSE: ICD-10-CM

## 2022-04-08 LAB
ALBUMIN SERPL-MCNC: 4.5 G/DL (ref 3.5–5.2)
ALBUMIN/GLOB SERPL: 1.6 G/DL
ALP SERPL-CCNC: 59 U/L (ref 39–117)
ALT SERPL W P-5'-P-CCNC: 18 U/L (ref 1–33)
ANION GAP SERPL CALCULATED.3IONS-SCNC: 11.1 MMOL/L (ref 5–15)
AST SERPL-CCNC: 25 U/L (ref 1–32)
BILIRUB SERPL-MCNC: 0.2 MG/DL (ref 0–1.2)
BUN SERPL-MCNC: 18 MG/DL (ref 6–20)
BUN/CREAT SERPL: 23.4 (ref 7–25)
CALCIUM SPEC-SCNC: 9.2 MG/DL (ref 8.6–10.5)
CHLORIDE SERPL-SCNC: 104 MMOL/L (ref 98–107)
CHOLEST SERPL-MCNC: 153 MG/DL (ref 0–200)
CO2 SERPL-SCNC: 19.9 MMOL/L (ref 22–29)
CREAT SERPL-MCNC: 0.77 MG/DL (ref 0.57–1)
EGFRCR SERPLBLD CKD-EPI 2021: 97.7 ML/MIN/1.73
GLOBULIN UR ELPH-MCNC: 2.9 GM/DL
GLUCOSE SERPL-MCNC: 107 MG/DL (ref 65–99)
HBA1C MFR BLD: 5.6 % (ref 4.8–5.6)
HDLC SERPL-MCNC: 21 MG/DL (ref 40–60)
LDLC SERPL CALC-MCNC: 90 MG/DL (ref 0–100)
LDLC/HDLC SERPL: 3.91 {RATIO}
POTASSIUM SERPL-SCNC: 4.1 MMOL/L (ref 3.5–5.2)
PROT SERPL-MCNC: 7.4 G/DL (ref 6–8.5)
SODIUM SERPL-SCNC: 135 MMOL/L (ref 136–145)
TRIGL SERPL-MCNC: 249 MG/DL (ref 0–150)
VLDLC SERPL-MCNC: 42 MG/DL (ref 5–40)

## 2022-04-08 PROCEDURE — 83036 HEMOGLOBIN GLYCOSYLATED A1C: CPT | Performed by: NURSE PRACTITIONER

## 2022-04-08 PROCEDURE — 36415 COLL VENOUS BLD VENIPUNCTURE: CPT | Performed by: NURSE PRACTITIONER

## 2022-04-08 PROCEDURE — 80053 COMPREHEN METABOLIC PANEL: CPT | Performed by: NURSE PRACTITIONER

## 2022-04-08 PROCEDURE — 80061 LIPID PANEL: CPT | Performed by: NURSE PRACTITIONER

## 2022-04-11 RX ORDER — FESOTERODINE FUMARATE 8 MG/1
8 TABLET, FILM COATED, EXTENDED RELEASE ORAL
Qty: 30 TABLET | Refills: 5 | Status: SHIPPED | OUTPATIENT
Start: 2022-04-11 | End: 2022-10-17 | Stop reason: SDUPTHER

## 2022-04-12 ENCOUNTER — OFFICE VISIT (OUTPATIENT)
Dept: FAMILY MEDICINE CLINIC | Facility: CLINIC | Age: 47
End: 2022-04-12

## 2022-04-12 VITALS
OXYGEN SATURATION: 97 % | HEART RATE: 88 BPM | BODY MASS INDEX: 42.91 KG/M2 | WEIGHT: 273.4 LBS | DIASTOLIC BLOOD PRESSURE: 84 MMHG | TEMPERATURE: 99.1 F | HEIGHT: 67 IN | SYSTOLIC BLOOD PRESSURE: 136 MMHG

## 2022-04-12 DIAGNOSIS — I10 BENIGN ESSENTIAL HYPERTENSION: Primary | ICD-10-CM

## 2022-04-12 DIAGNOSIS — E78.2 MIXED HYPERLIPIDEMIA: ICD-10-CM

## 2022-04-12 DIAGNOSIS — R73.01 IMPAIRED FASTING GLUCOSE: ICD-10-CM

## 2022-04-12 DIAGNOSIS — N31.8 HYPERTONICITY OF BLADDER: ICD-10-CM

## 2022-04-12 DIAGNOSIS — G43.719 INTRACTABLE CHRONIC MIGRAINE WITHOUT AURA AND WITHOUT STATUS MIGRAINOSUS: ICD-10-CM

## 2022-04-12 PROCEDURE — 99214 OFFICE O/P EST MOD 30 MIN: CPT | Performed by: NURSE PRACTITIONER

## 2022-04-12 RX ORDER — AMITRIPTYLINE HYDROCHLORIDE 10 MG/1
1 TABLET, FILM COATED ORAL NIGHTLY
COMMUNITY
Start: 2022-03-17 | End: 2022-10-17

## 2022-04-12 RX ORDER — CETIRIZINE HYDROCHLORIDE 10 MG/1
10 TABLET ORAL DAILY
Qty: 30 TABLET | Refills: 6 | Status: SHIPPED | OUTPATIENT
Start: 2022-04-12 | End: 2022-10-13

## 2022-04-12 RX ORDER — PROPRANOLOL HYDROCHLORIDE 40 MG/1
40 TABLET ORAL 2 TIMES DAILY
Qty: 180 TABLET | Refills: 1 | Status: SHIPPED | OUTPATIENT
Start: 2022-04-12 | End: 2022-10-17 | Stop reason: SDUPTHER

## 2022-04-12 RX ORDER — FENOFIBRATE 145 MG/1
145 TABLET, COATED ORAL DAILY
Qty: 30 TABLET | Refills: 5 | Status: SHIPPED | OUTPATIENT
Start: 2022-04-12 | End: 2022-10-17 | Stop reason: SDUPTHER

## 2022-04-12 NOTE — ASSESSMENT & PLAN NOTE
Headaches are improving with treatment.  Continue current treatment regimen.  Patient will continue to follow with neurology.

## 2022-04-12 NOTE — PROGRESS NOTES
Chief Complaint  3 month follow up, Migraine, Hyperlipidemia, and bladder hypertonicity    Subjective          Debi Schroeder presents to [facility]  History of Present Illness   46-year-old female presents today for 3-month follow-up.    Hypertension: Blood pressure stable on propanolol as listed.    Mixed hyperlipidemia: I increased her fenofibrate dose to 160 mg on her last visit due to her triglycerides still being high.  She had her labs drawn last week and her triglycerides are still elevated at 249 but did improve some.  Her HDL is low at 21, LDL is 90, total cholesterol 153.    She has had impaired fasting glucose in the past but her most recent A1c did improve to 5.6%.    Overactive bladder: She is stable on Toviaz 8 mg daily.    Chronic migraine: She is following with neurology CONNOR Cooper, getting Botox injections.  She states the Botox injections are starting to help more.  She is taking Nurtec as needed and sometimes has to take rizatriptan as needed.    Cervical degenerative disc disease: She did see neurosurgeon, surgery was not recommended due to most likely not beneficial.  She is following with pain management and they did discuss cervical RFA trial.  She states that she is scheduled to have a medial branch block and that if this is successful she may have an ablation.    Debi Schroeder  reports that she has been smoking cigarettes. She started smoking about 27 years ago. She has a 26.00 pack-year smoking history. She has never used smokeless tobacco.. I have educated her on the risk of diseases from using tobacco products such as cancer, COPD and heart disease.     I advised her to quit and she is not willing to quit.  She states that she has tried to quit, has used patches and Wellbutrin in the past.  She states that her  still smokes and until he decides to quit smoking she is having difficulty quitting.  We discussed that the generic for  Chantix is now back on the market and I recommend that she try it.  She is not ready at this time.    I spent 6 minutes counseling the patient.    Current Outpatient Medications on File Prior to Visit   Medication Sig Dispense Refill   • albuterol sulfate  (90 Base) MCG/ACT inhaler INHALE 1 PUFF BY MOUTH EVERY 6 HOURS AS NEEDED 18 g 5   • amitriptyline (ELAVIL) 10 MG tablet Take 1 tablet by mouth Every Night.     • fluticasone (FLONASE) 50 MCG/ACT nasal spray SHAKE LIQUID AND USE 1 TO 2 SPRAYS IN EACH NOSTRIL EVERY DAY     • ibuprofen (ADVIL,MOTRIN) 800 MG tablet TAKE 1 TABLET BY MOUTH EVERY 8 HOURS AS NEEDED FOR MILD PAIN 90 tablet 5   • methocarbamol (ROBAXIN) 750 MG tablet methocarbamol 750 mg oral tablet take 1 tablet (750 mg) by oral route 3 times per day   Active     • oxyCODONE-acetaminophen (PERCOCET) 7.5-325 MG per tablet Percocet 7.5-325 mg oral tablet take 1 tablet by oral route every 6 hours as needed   Active     • Rimegepant Sulfate (Nurtec) 75 MG tablet dispersible tablet Take 1 tablet by mouth Daily As Needed (migraine). 8 tablet 3   • rizatriptan MLT (MAXALT-MLT) 10 MG disintegrating tablet Place 1 tablet on the tongue 1 (One) Time As Needed for Migraine for up to 1 dose. May repeat in 2 hours if needed 9 tablet 3   • Toviaz 8 MG tablet sustained-release 24 hour tablet TAKE 1 TABLET BY MOUTH DAILY 30 tablet 5   • [DISCONTINUED] cetirizine (zyrTEC) 10 MG tablet TAKE 1 TABLET BY MOUTH EVERY DAY 30 tablet 0   • [DISCONTINUED] fenofibrate (TRICOR) 145 MG tablet Take 1 tablet by mouth Daily. 30 tablet 5   • [DISCONTINUED] propranolol (INDERAL) 40 MG tablet Take 1 tablet by mouth 2 (Two) Times a Day. 180 tablet 1   • [DISCONTINUED] Rimegepant Sulfate (Nurtec) 75 MG tablet dispersible tablet Take 1 tablet by mouth Daily As Needed (migraine). 2 tablet 0   • [DISCONTINUED] SUMAtriptan (IMITREX) 50 MG tablet        No current facility-administered medications on file prior to visit.       Objective  "  Vital Signs:   /84   Pulse 88   Temp 99.1 °F (37.3 °C)   Ht 170.2 cm (67\")   Wt 124 kg (273 lb 6.4 oz)   SpO2 97%   BMI 42.82 kg/m²     Physical Exam  Vitals reviewed.   Constitutional:       Appearance: Normal appearance. She is well-developed.   Neck:      Thyroid: No thyroid mass, thyromegaly or thyroid tenderness.   Cardiovascular:      Rate and Rhythm: Normal rate and regular rhythm.      Heart sounds: No murmur heard.    No friction rub. No gallop.   Pulmonary:      Effort: Pulmonary effort is normal.      Breath sounds: Normal breath sounds. No wheezing or rhonchi.   Lymphadenopathy:      Cervical: No cervical adenopathy.   Skin:     General: Skin is warm and dry.   Neurological:      Mental Status: She is alert and oriented to person, place, and time.      Cranial Nerves: No cranial nerve deficit.   Psychiatric:         Mood and Affect: Mood and affect normal.         Behavior: Behavior normal.         Thought Content: Thought content normal. Thought content does not include homicidal or suicidal ideation.         Judgment: Judgment normal.        Result Review :     CMP    CMP 9/30/21 1/4/22 4/8/22   Glucose 103 (A) 116 (A) 107 (A)   BUN 18 19 18   Creatinine 0.89 0.84 0.77   eGFR Non African Am 69 74    Sodium 138 140 135 (A)   Potassium 4.4 4.1 4.1   Chloride 106 107 104   Calcium 9.3 9.4 9.2   Albumin 4.30 4.50 4.50   Total Bilirubin 0.2 <0.2 0.2   Alkaline Phosphatase 99 75 59   AST (SGOT) 25 23 25   ALT (SGPT) 22 18 18   (A) Abnormal value            CBC    CBC 9/30/21   WBC 10.06   RBC 4.29   Hemoglobin 13.8   Hematocrit 40.2   MCV 93.7   MCH 32.2   MCHC 34.3   RDW 12.1 (A)   Platelets 276   (A) Abnormal value            Lipid Panel    Lipid Panel 9/30/21 1/4/22 4/8/22   Total Cholesterol 188 161 153   Triglycerides 291 (A) 295 (A) 249 (A)   HDL Cholesterol 27 (A) 28 (A) 21 (A)   VLDL Cholesterol 51 (A) 49 (A) 42 (A)   LDL Cholesterol  110 (A) 84 90   LDL/HDL Ratio 3.81 2.64 3.91   (A) " Abnormal value            A1C Last 3 Results    HGBA1C Last 3 Results 9/30/21 4/8/22   Hemoglobin A1C 5.84 (A) 5.60   (A) Abnormal value                      Assessment and Plan    Diagnoses and all orders for this visit:    1. Benign essential hypertension (Primary)  Assessment & Plan:  Hypertension is improving with treatment.  Continue current treatment regimen.  Continue current medications.  Blood pressure will be reassessed at the next regular appointment.      2. Mixed hyperlipidemia  Assessment & Plan:  Lipid abnormalities are improving with treatment.  Pharmacotherapy as ordered.  Lipids will be reassessed in 6 months.      3. Hypertonicity of bladder  Assessment & Plan:  Currently stable on Toviaz, patient will continue current dose.      4. Impaired fasting glucose  Assessment & Plan:  Improving with lifestyle changes, patient to continue diet low in carbs and sugars.      5. Intractable chronic migraine without aura and without status migrainosus  Assessment & Plan:  Headaches are improving with treatment.  Continue current treatment regimen.  Patient will continue to follow with neurology.          Other orders  -     propranolol (INDERAL) 40 MG tablet; Take 1 tablet by mouth 2 (Two) Times a Day.  Dispense: 180 tablet; Refill: 1  -     cetirizine (zyrTEC) 10 MG tablet; Take 1 tablet by mouth Daily.  Dispense: 30 tablet; Refill: 6  -     fenofibrate (TRICOR) 145 MG tablet; Take 1 tablet by mouth Daily.  Dispense: 30 tablet; Refill: 5      Follow Up   Return in about 6 months (around 10/12/2022) for Next scheduled follow up.  Patient was given instructions and counseling regarding her condition or for health maintenance advice. Please see specific information pulled into the AVS if appropriate.

## 2022-04-12 NOTE — PATIENT INSTRUCTIONS
Steps to Quit Smoking  Smoking tobacco is the leading cause of preventable death. It can affect almost every organ in the body. Smoking puts you and those around you at risk for developing many serious chronic diseases. Quitting smoking can be difficult, but it is one of the best things that you can do for your health. It is never too late to quit.  How do I get ready to quit?  When you decide to quit smoking, create a plan to help you succeed. Before you quit:  Pick a date to quit. Set a date within the next 2 weeks to give you time to prepare.  Write down the reasons why you are quitting. Keep this list in places where you will see it often.  Tell your family, friends, and co-workers that you are quitting. Support from your loved ones can make quitting easier.  Talk with your health care provider about your options for quitting smoking.  Find out what treatment options are covered by your health insurance.  Identify people, places, things, and activities that make you want to smoke (triggers). Avoid them.  What first steps can I take to quit smoking?  Throw away all cigarettes at home, at work, and in your car.  Throw away smoking accessories, such as ashtrays and lighters.  Clean your car. Make sure to empty the ashtray.  Clean your home, including curtains and carpets.  What strategies can I use to quit smoking?  Talk with your health care provider about combining strategies, such as taking medicines while you are also receiving in-person counseling. Using these two strategies together makes you more likely to succeed in quitting than if you used either strategy on its own.  If you are pregnant or breastfeeding, talk with your health care provider about finding counseling or other support strategies to quit smoking. Do not take medicine to help you quit smoking unless your health care provider tells you to do so.  To quit smoking:  Quit right away  Quit smoking completely, instead of gradually reducing how much  you smoke over a period of time. Research shows that stopping smoking right away is more successful than gradually quitting.  Attend in-person counseling to help you build problem-solving skills. You are more likely to succeed in quitting if you attend counseling sessions regularly. Even short sessions of 10 minutes can be effective.  Take medicine  You may take medicines to help you quit smoking. Some medicines require a prescription and some you can purchase over-the-counter. Medicines may have nicotine in them to replace the nicotine in cigarettes. Medicines may:  Help to stop cravings.  Help to relieve withdrawal symptoms.  Your health care provider may recommend:  Nicotine patches, gum, or lozenges.  Nicotine inhalers or sprays.  Non-nicotine medicine that is taken by mouth.  Find resources  Find resources and support systems that can help you to quit smoking and remain smoke-free after you quit. These resources are most helpful when you use them often. They include:  Online chats with a counselor.  Telephone quitlines.  Printed self-help materials.  Support groups or group counseling.  Text messaging programs.  Mobile phone apps or applications. Use apps that can help you stick to your quit plan by providing reminders, tips, and encouragement. There are many free apps for mobile devices as well as websites. Examples include Quit Guide from the CDC and smokefree.gov  What things can I do to make it easier to quit?    Reach out to your family and friends for support and encouragement. Call telephone quitlines (4-577-QUIT-NOW), reach out to support groups, or work with a counselor for support.  Ask people who smoke to avoid smoking around you.  Avoid places that trigger you to smoke, such as bars, parties, or smoke-break areas at work.  Spend time with people who do not smoke.  Lessen the stress in your life. Stress can be a smoking trigger for some people. To lessen stress, try:  Exercising regularly.  Doing  deep-breathing exercises.  Doing yoga.  Meditating.  Performing a body scan. This involves closing your eyes, scanning your body from head to toe, and noticing which parts of your body are particularly tense. Try to relax the muscles in those areas.  How will I feel when I quit smoking?  Day 1 to 3 weeks  Within the first 24 hours of quitting smoking, you may start to feel withdrawal symptoms. These symptoms are usually most noticeable 2-3 days after quitting, but they usually do not last for more than 2-3 weeks. You may experience these symptoms:  Mood swings.  Restlessness, anxiety, or irritability.  Trouble concentrating.  Dizziness.  Strong cravings for sugary foods and nicotine.  Mild weight gain.  Constipation.  Nausea.  Coughing or a sore throat.  Changes in how the medicines that you take for unrelated issues work in your body.  Depression.  Trouble sleeping (insomnia).  Week 3 and afterward  After the first 2-3 weeks of quitting, you may start to notice more positive results, such as:  Improved sense of smell and taste.  Decreased coughing and sore throat.  Slower heart rate.  Lower blood pressure.  Clearer skin.  The ability to breathe more easily.  Fewer sick days.  Quitting smoking can be very challenging. Do not get discouraged if you are not successful the first time. Some people need to make many attempts to quit before they achieve long-term success. Do your best to stick to your quit plan, and talk with your health care provider if you have any questions or concerns.  Summary  Smoking tobacco is the leading cause of preventable death. Quitting smoking is one of the best things that you can do for your health.  When you decide to quit smoking, create a plan to help you succeed.  Quit smoking right away, not slowly over a period of time.  When you start quitting, seek help from your health care provider, family, or friends.  This information is not intended to replace advice given to you by your  health care provider. Make sure you discuss any questions you have with your health care provider.  Document Revised: 09/11/2020 Document Reviewed: 03/07/2020  Elsevier Patient Education © 2021 [employer]

## 2022-05-09 ENCOUNTER — TELEMEDICINE (OUTPATIENT)
Dept: NEUROLOGY | Facility: CLINIC | Age: 47
End: 2022-05-09

## 2022-05-09 DIAGNOSIS — G43.719 INTRACTABLE CHRONIC MIGRAINE WITHOUT AURA AND WITHOUT STATUS MIGRAINOSUS: Primary | ICD-10-CM

## 2022-05-09 PROCEDURE — 99214 OFFICE O/P EST MOD 30 MIN: CPT | Performed by: NURSE PRACTITIONER

## 2022-05-09 NOTE — PROGRESS NOTES
Chief Complaint  Migraine    Subjective          Debi Schroeder presents to [facility]  She presents to the office for Botox follow-up.  Last Botox injection was on 3/11/22.  States she is having 8 headache days per month.  Feels that she has seen improvement in migraines with Botox for preventative therapy.  Uses  Maxalt with good effect, but sometimes experiences drowsiness.     Previous abortive migraine medications: imitrex, maxalt, relpax,         Objective   Vital Signs:   There were no vitals taken for this visit.    Physical Exam  HENT:      Head: Normocephalic.   Pulmonary:      Effort: Pulmonary effort is normal.   Neurological:      Mental Status: She is alert and oriented to person, place, and time.      Cranial Nerves: Cranial nerves are intact.      Sensory: Sensation is intact.      Motor: Motor function is intact.      Coordination: Coordination is intact.      Deep Tendon Reflexes: Reflexes are normal and symmetric.        Neurologic Exam     Mental Status   Oriented to person, place, and time.        Result Review :               Assessment and Plan    Diagnoses and all orders for this visit:    1. Intractable chronic migraine without aura and without status migrainosus (Primary)  Assessment & Plan:  Migraines are not yet at goal.  Discussed building effects of Botox. Continue botox for preventative therapy of migraine and Maxalt PRN for abortive therapy.     Botox will be administered per accepted algorithm for chronic migraine with a total of 155 units given divided as follows: 10 units in the , 5 units in the procerus, 20 units in the frontalis, 40 units in the temporalis, 30 units in the occipitalis, 20 units in the cervical paraspinals and 30 units in the trapezius.  This therapy will be given every 12 weeks.                 Follow Up   Return in 25 days (on 6/3/2022) for Botox .  Patient was given instructions and counseling regarding her  condition or for health maintenance advice. Please see specific information pulled into the AVS if appropriate.

## 2022-06-01 ENCOUNTER — PATIENT MESSAGE (OUTPATIENT)
Dept: NEUROLOGY | Facility: CLINIC | Age: 47
End: 2022-06-01

## 2022-06-03 ENCOUNTER — PROCEDURE VISIT (OUTPATIENT)
Dept: NEUROLOGY | Facility: CLINIC | Age: 47
End: 2022-06-03

## 2022-06-03 DIAGNOSIS — G43.719 INTRACTABLE CHRONIC MIGRAINE WITHOUT AURA AND WITHOUT STATUS MIGRAINOSUS: Primary | ICD-10-CM

## 2022-06-03 PROCEDURE — 64615 CHEMODENERV MUSC MIGRAINE: CPT | Performed by: NURSE PRACTITIONER

## 2022-06-06 RX ORDER — RIZATRIPTAN BENZOATE 10 MG/1
10 TABLET, ORALLY DISINTEGRATING ORAL ONCE AS NEEDED
Qty: 9 TABLET | Refills: 3 | Status: SHIPPED | OUTPATIENT
Start: 2022-06-06 | End: 2022-08-11 | Stop reason: SDUPTHER

## 2022-07-11 RX ORDER — FENOFIBRATE 145 MG/1
145 TABLET, COATED ORAL DAILY
Qty: 30 TABLET | Refills: 5 | OUTPATIENT
Start: 2022-07-11

## 2022-07-18 RX ORDER — SUMATRIPTAN 50 MG/1
TABLET, FILM COATED ORAL
Qty: 9 TABLET | Refills: 2 | OUTPATIENT
Start: 2022-07-18

## 2022-08-11 ENCOUNTER — PATIENT MESSAGE (OUTPATIENT)
Dept: NEUROLOGY | Facility: CLINIC | Age: 47
End: 2022-08-11

## 2022-08-11 ENCOUNTER — TELEMEDICINE (OUTPATIENT)
Dept: NEUROLOGY | Facility: CLINIC | Age: 47
End: 2022-08-11

## 2022-08-11 DIAGNOSIS — G43.719 INTRACTABLE CHRONIC MIGRAINE WITHOUT AURA AND WITHOUT STATUS MIGRAINOSUS: Primary | ICD-10-CM

## 2022-08-11 PROCEDURE — 99213 OFFICE O/P EST LOW 20 MIN: CPT | Performed by: NURSE PRACTITIONER

## 2022-08-11 RX ORDER — RIZATRIPTAN BENZOATE 10 MG/1
10 TABLET, ORALLY DISINTEGRATING ORAL ONCE AS NEEDED
Qty: 9 TABLET | Refills: 3 | Status: SHIPPED | OUTPATIENT
Start: 2022-08-11 | End: 2022-10-05 | Stop reason: SDUPTHER

## 2022-08-11 NOTE — ASSESSMENT & PLAN NOTE
Migraines are improved on Botox. Continue botox for preventative therapy of migraine and Maxalt PRN for abortive therapy.     Botox will be administered per accepted algorithm for chronic migraine with a total of 155 units given divided as follows: 10 units in the , 5 units in the procerus, 20 units in the frontalis, 40 units in the temporalis, 30 units in the occipitalis, 20 units in the cervical paraspinals and 30 units in the trapezius.  This therapy will be given every 12 weeks.

## 2022-08-11 NOTE — PROGRESS NOTES
Chief Complaint  Migraine    Subjective          Debi Schroeder presents to [facility]  She presents to the office for Botox follow-up.  Last Botox injection was on 6/3/22.  States she is having 5 headache days per month.  Feels that she has seen improvement in migraines with Botox for preventative therapy.  Uses  Maxalt with good effect.  Denies side effect.          Objective   Vital Signs:   There were no vitals taken for this visit.    Physical Exam  HENT:      Head: Normocephalic.   Pulmonary:      Effort: Pulmonary effort is normal.   Neurological:      Mental Status: She is alert and oriented to person, place, and time.      Cranial Nerves: Cranial nerves are intact.      Sensory: Sensation is intact.      Motor: Motor function is intact.      Coordination: Coordination is intact.      Deep Tendon Reflexes: Reflexes are normal and symmetric.        Neurologic Exam     Mental Status   Oriented to person, place, and time.        Result Review :               Assessment and Plan    Diagnoses and all orders for this visit:    1. Intractable chronic migraine without aura and without status migrainosus (Primary)  Assessment & Plan:  Migraines are improved on Botox. Continue botox for preventative therapy of migraine and Maxalt PRN for abortive therapy.     Botox will be administered per accepted algorithm for chronic migraine with a total of 155 units given divided as follows: 10 units in the , 5 units in the procerus, 20 units in the frontalis, 40 units in the temporalis, 30 units in the occipitalis, 20 units in the cervical paraspinals and 30 units in the trapezius.  This therapy will be given every 12 weeks.               Other orders  -     rizatriptan MLT (MAXALT-MLT) 10 MG disintegrating tablet; Place 1 tablet on the tongue 1 (One) Time As Needed for Migraine for up to 1 dose. May repeat in 2 hours if needed  Dispense: 9 tablet; Refill: 3      Follow Up    Return in 15 days (on 8/26/2022) for Botox .  Patient was given instructions and counseling regarding her condition or for health maintenance advice. Please see specific information pulled into the AVS if appropriate.

## 2022-09-16 ENCOUNTER — PATIENT MESSAGE (OUTPATIENT)
Dept: NEUROLOGY | Facility: CLINIC | Age: 47
End: 2022-09-16

## 2022-09-22 ENCOUNTER — PRIOR AUTHORIZATION (OUTPATIENT)
Dept: NEUROLOGY | Facility: CLINIC | Age: 47
End: 2022-09-22

## 2022-09-26 RX ORDER — FESOTERODINE FUMARATE 8 MG/1
8 TABLET, FILM COATED, EXTENDED RELEASE ORAL
Qty: 30 TABLET | Refills: 5 | OUTPATIENT
Start: 2022-09-26

## 2022-09-26 RX ORDER — IBUPROFEN 800 MG/1
TABLET ORAL
Qty: 90 TABLET | Refills: 5 | OUTPATIENT
Start: 2022-09-26

## 2022-09-27 ENCOUNTER — TELEPHONE (OUTPATIENT)
Dept: FAMILY MEDICINE CLINIC | Facility: CLINIC | Age: 47
End: 2022-09-27

## 2022-09-27 NOTE — TELEPHONE ENCOUNTER
Patient missed appointment on 09/26/2022 @ 4:15 with Berta Tucker. Spoke to patient - stated she forgot to cancel appointment after being called into work early. Explained policy concerning missed appointments and same day cancellations. Rescheduled appointment

## 2022-09-29 ENCOUNTER — TELEPHONE (OUTPATIENT)
Dept: NEUROLOGY | Facility: CLINIC | Age: 47
End: 2022-09-29

## 2022-09-29 NOTE — TELEPHONE ENCOUNTER
Provider: CONNOR GUZMÁN  Caller: NICA BOTELLO  Relationship to Patient: SELF  Phone Number: [phone]    Reason for Call: PT CALLING CAUSE SHE HAS A BOTOX APPT TOMORROW AND SHE HAD A ABLASION ON LT SIDE OF NECK DONE LAST Friday AND SHE WANTS TO MAKE SURE IT'S ON TO COME AND GET THE BOTOX TOMORROW.PLEASE CALL HER BACK AND LEAVE A MESSAGE

## 2022-10-05 RX ORDER — RIZATRIPTAN BENZOATE 10 MG/1
10 TABLET, ORALLY DISINTEGRATING ORAL ONCE AS NEEDED
Qty: 9 TABLET | Refills: 3 | Status: SHIPPED | OUTPATIENT
Start: 2022-10-05

## 2022-10-13 RX ORDER — CETIRIZINE HYDROCHLORIDE 10 MG/1
TABLET ORAL
Qty: 90 TABLET | Refills: 1 | Status: SHIPPED | OUTPATIENT
Start: 2022-10-13

## 2022-10-17 ENCOUNTER — OFFICE VISIT (OUTPATIENT)
Dept: FAMILY MEDICINE CLINIC | Facility: CLINIC | Age: 47
End: 2022-10-17

## 2022-10-17 VITALS
TEMPERATURE: 98.5 F | BODY MASS INDEX: 42.85 KG/M2 | HEIGHT: 67 IN | OXYGEN SATURATION: 99 % | SYSTOLIC BLOOD PRESSURE: 160 MMHG | WEIGHT: 273 LBS | DIASTOLIC BLOOD PRESSURE: 92 MMHG | HEART RATE: 87 BPM

## 2022-10-17 DIAGNOSIS — R73.01 IMPAIRED FASTING GLUCOSE: ICD-10-CM

## 2022-10-17 DIAGNOSIS — G89.29 CHRONIC NECK PAIN: ICD-10-CM

## 2022-10-17 DIAGNOSIS — E66.01 CLASS 3 SEVERE OBESITY DUE TO EXCESS CALORIES WITH SERIOUS COMORBIDITY AND BODY MASS INDEX (BMI) OF 40.0 TO 44.9 IN ADULT: ICD-10-CM

## 2022-10-17 DIAGNOSIS — N31.8 HYPERTONICITY OF BLADDER: ICD-10-CM

## 2022-10-17 DIAGNOSIS — G43.719 INTRACTABLE CHRONIC MIGRAINE WITHOUT AURA AND WITHOUT STATUS MIGRAINOSUS: ICD-10-CM

## 2022-10-17 DIAGNOSIS — E78.2 MIXED HYPERLIPIDEMIA: ICD-10-CM

## 2022-10-17 DIAGNOSIS — Z12.31 ENCOUNTER FOR SCREENING MAMMOGRAM FOR MALIGNANT NEOPLASM OF BREAST: ICD-10-CM

## 2022-10-17 DIAGNOSIS — M54.2 CHRONIC NECK PAIN: ICD-10-CM

## 2022-10-17 DIAGNOSIS — I10 ESSENTIAL HYPERTENSION: Primary | ICD-10-CM

## 2022-10-17 DIAGNOSIS — Z23 VACCINE FOR STREPTOCOCCUS PNEUMONIAE AND INFLUENZA: ICD-10-CM

## 2022-10-17 PROBLEM — E66.813 CLASS 3 SEVERE OBESITY DUE TO EXCESS CALORIES WITH SERIOUS COMORBIDITY AND BODY MASS INDEX (BMI) OF 40.0 TO 44.9 IN ADULT: Status: ACTIVE | Noted: 2022-10-17

## 2022-10-17 LAB
BASOPHILS # BLD AUTO: 0.09 10*3/MM3 (ref 0–0.2)
BASOPHILS NFR BLD AUTO: 0.6 % (ref 0–1.5)
CHOLEST SERPL-MCNC: 154 MG/DL (ref 0–200)
DEPRECATED RDW RBC AUTO: 44.2 FL (ref 37–54)
EOSINOPHIL # BLD AUTO: 0.23 10*3/MM3 (ref 0–0.4)
EOSINOPHIL NFR BLD AUTO: 1.5 % (ref 0.3–6.2)
ERYTHROCYTE [DISTWIDTH] IN BLOOD BY AUTOMATED COUNT: 12.2 % (ref 12.3–15.4)
HCT VFR BLD AUTO: 46.2 % (ref 34–46.6)
HDLC SERPL-MCNC: 32 MG/DL (ref 40–60)
HGB BLD-MCNC: 15.5 G/DL (ref 12–15.9)
IMM GRANULOCYTES # BLD AUTO: 0.09 10*3/MM3 (ref 0–0.05)
IMM GRANULOCYTES NFR BLD AUTO: 0.6 % (ref 0–0.5)
LDLC SERPL CALC-MCNC: 85 MG/DL (ref 0–100)
LDLC/HDLC SERPL: 2.43 {RATIO}
LYMPHOCYTES # BLD AUTO: 3.36 10*3/MM3 (ref 0.7–3.1)
LYMPHOCYTES NFR BLD AUTO: 21.9 % (ref 19.6–45.3)
MCH RBC QN AUTO: 32.6 PG (ref 26.6–33)
MCHC RBC AUTO-ENTMCNC: 33.5 G/DL (ref 31.5–35.7)
MCV RBC AUTO: 97.1 FL (ref 79–97)
MONOCYTES # BLD AUTO: 1.06 10*3/MM3 (ref 0.1–0.9)
MONOCYTES NFR BLD AUTO: 6.9 % (ref 5–12)
NEUTROPHILS NFR BLD AUTO: 10.51 10*3/MM3 (ref 1.7–7)
NEUTROPHILS NFR BLD AUTO: 68.5 % (ref 42.7–76)
NRBC BLD AUTO-RTO: 0 /100 WBC (ref 0–0.2)
PLATELET # BLD AUTO: 379 10*3/MM3 (ref 140–450)
PMV BLD AUTO: 11.6 FL (ref 6–12)
RBC # BLD AUTO: 4.76 10*6/MM3 (ref 3.77–5.28)
T-UPTAKE NFR SERPL: 1.12 TBI (ref 0.8–1.3)
T4 SERPL-MCNC: 10.5 MCG/DL (ref 4.5–11.7)
TRIGL SERPL-MCNC: 221 MG/DL (ref 0–150)
TSH SERPL DL<=0.05 MIU/L-ACNC: 2.25 UIU/ML (ref 0.27–4.2)
VLDLC SERPL-MCNC: 37 MG/DL (ref 5–40)
WBC NRBC COR # BLD: 15.34 10*3/MM3 (ref 3.4–10.8)

## 2022-10-17 PROCEDURE — 84443 ASSAY THYROID STIM HORMONE: CPT

## 2022-10-17 PROCEDURE — 90686 IIV4 VACC NO PRSV 0.5 ML IM: CPT

## 2022-10-17 PROCEDURE — 85025 COMPLETE CBC W/AUTO DIFF WBC: CPT

## 2022-10-17 PROCEDURE — 80061 LIPID PANEL: CPT

## 2022-10-17 PROCEDURE — 84436 ASSAY OF TOTAL THYROXINE: CPT

## 2022-10-17 PROCEDURE — 80053 COMPREHEN METABOLIC PANEL: CPT

## 2022-10-17 PROCEDURE — 90677 PCV20 VACCINE IM: CPT

## 2022-10-17 PROCEDURE — 83036 HEMOGLOBIN GLYCOSYLATED A1C: CPT

## 2022-10-17 PROCEDURE — 84479 ASSAY OF THYROID (T3 OR T4): CPT

## 2022-10-17 PROCEDURE — 90472 IMMUNIZATION ADMIN EACH ADD: CPT

## 2022-10-17 PROCEDURE — 99214 OFFICE O/P EST MOD 30 MIN: CPT

## 2022-10-17 PROCEDURE — 90471 IMMUNIZATION ADMIN: CPT

## 2022-10-17 RX ORDER — FESOTERODINE FUMARATE 8 MG/1
8 TABLET, EXTENDED RELEASE ORAL
Qty: 30 TABLET | Refills: 5 | Status: SHIPPED | OUTPATIENT
Start: 2022-10-17

## 2022-10-17 RX ORDER — HYDROCHLOROTHIAZIDE 12.5 MG/1
12.5 TABLET ORAL DAILY
Qty: 30 TABLET | Refills: 2 | Status: SHIPPED | OUTPATIENT
Start: 2022-10-17

## 2022-10-17 RX ORDER — PROPRANOLOL HYDROCHLORIDE 40 MG/1
40 TABLET ORAL 2 TIMES DAILY
Qty: 180 TABLET | Refills: 1 | Status: SHIPPED | OUTPATIENT
Start: 2022-10-17 | End: 2022-12-22

## 2022-10-17 RX ORDER — FENOFIBRATE 145 MG/1
145 TABLET, COATED ORAL DAILY
Qty: 30 TABLET | Refills: 5 | Status: SHIPPED | OUTPATIENT
Start: 2022-10-17

## 2022-10-17 NOTE — ASSESSMENT & PLAN NOTE
Patient's (Body mass index is 42.76 kg/m².) indicates that they are morbidly obese (BMI > 40 or > 35 with obesity - related health condition) with health conditions that include hypertension and dyslipidemias . Weight is unchanged. BMI is is above average; BMI management plan is completed. We discussed portion control and increasing exercise.

## 2022-10-17 NOTE — PROGRESS NOTES
Chief Complaint  Chief Complaint   Patient presents with   • Hypertension       Subjective          Debi Schroeder presents to [facility]  History of Present Illness  Patient presents today to follow up on hypertension, hyperlipidemia, overactive bladder, chronic migraines, chronic neck pain.     She is currently taking Propanalol 40mg twice daily for hypertension. BP is elevated today at 160/92. She reports that her BP has been elevated at her recent pain management visits as well. She denies any chest pain, swelling.     Overactive bladder symptoms are stable on  Toviaz 8 mg daily.    She sees neurology regularly for migraines. She is receiving botox injections approximately every 8 weeks. She takes Maxalt as needed for abortive therapy.    She sees pain management through [facility] for chronic cervical neck pain. She has had an ablation of her left cervical spine and and plans on having it done on the right side soon.    Patient reports a Hx of HPV; had pap smear and IUD placed about a year ago at [facility]. Encouraged to call to schedule F/U visit with OBGYN for repeat exam.     She has never had a mammogram and is agreeable to scheduling one.       Objective     Medical History:  Past Medical History:   Diagnosis Date   • Abdominal pain    • Allergic    • Allergic rhinitis due to allergen 03/23/2020   • Arthritis    • Bladder problem    • COPD (chronic obstructive pulmonary disease) ([facility])    • CTS (carpal tunnel syndrome) 2001    Just some pain, no surgery's   • DDD (degenerative disc disease), lumbar    • Depression with anxiety    • Difficulty walking 09/2021    Do to my back injury   • Dysmenorrhea 06/26/2020   • Essential hypertension 03/23/2020   • Essential hypertension, benign    • Fibromyalgia, primary 2020    I was told I may have fibromyalgia   • Headache, tension-type Late 90s    I've been getting them done my late teens   • Hidradenitis suppurativa    •  Hidradenitis suppurativa of left axilla 2020   • HPV (human papilloma virus) infection    • Hyperlipidemia    • IBS (irritable bowel syndrome)    • Impaired fasting glucose 2020   • Ingrown toenail    • Leg pain    • Leg swelling    • Low back pain    • Lumbar herniated disc    • Migraines    • Neuropathy    • Numbness in feet    • OAB (overactive bladder) 2020   • Pain management    • PTSD (post-traumatic stress disorder)    • Shingles    • Spinal stenosis    • Thyroid nodule      Past Surgical History:   Procedure Laterality Date   • BLADDER SURGERY      or    • CHOLECYSTECTOMY     • KIDNEY SURGERY      or       Social History     Tobacco Use   • Smoking status: Some Days     Packs/day: 1.00     Years: 26.00     Pack years: 26.00     Types: Cigarettes     Start date: 1995     Last attempt to quit: 2021     Years since quittin.9   • Smokeless tobacco: Never   • Tobacco comments:     Ddd   Vaping Use   • Vaping Use: Never used   Substance Use Topics   • Alcohol use: Never     Comment: less than 1 drink per day, has been drinking less than one year   • Drug use: Never     Family History   Problem Relation Age of Onset   • Arthritis Mother    • Heart disease Mother    • Diabetes Mother    • Depression Mother    • Hyperlipidemia Mother    • Hypertension Mother    • Arthritis Father    • Drug abuse Father    • Hyperlipidemia Father    • Hypertension Father    • Diabetes Maternal Grandmother    • Diabetes Paternal Grandmother         Unspecified       Medications:  Prior to Admission medications    Medication Sig Start Date End Date Taking? Authorizing Provider   albuterol sulfate  (90 Base) MCG/ACT inhaler INHALE 1 PUFF BY MOUTH EVERY 6 HOURS AS NEEDED 21  Yes Sarah Bowers APRN   cetirizine (zyrTEC) 10 MG tablet TAKE 1 TABLET BY MOUTH EVERY DAY 10/13/22  Yes Berta Tucker APRN   fenofibrate (TRICOR) 145 MG tablet Take 1 tablet by  "mouth Daily. 4/12/22  Yes Sarah Bowers APRN   fluticasone (FLONASE) 50 MCG/ACT nasal spray SHAKE LIQUID AND USE 1 TO 2 SPRAYS IN EACH NOSTRIL EVERY DAY 7/26/21  Yes Provider, MD Aurora   ibuprofen (ADVIL,MOTRIN) 800 MG tablet TAKE 1 TABLET BY MOUTH EVERY 8 HOURS AS NEEDED FOR MILD PAIN 4/6/22  Yes Sarah Bowers APRN   methocarbamol (ROBAXIN) 750 MG tablet methocarbamol 750 mg oral tablet take 1 tablet (750 mg) by oral route 3 times per day   Active   Yes Provider, MD Aurora   milnacipran (SAVELLA) 25 MG tablet tablet  7/14/22  Yes ProviderAurora MD   oxyCODONE-acetaminophen (PERCOCET) 7.5-325 MG per tablet Percocet 7.5-325 mg oral tablet take 1 tablet by oral route every 6 hours as needed   Active   Yes Provider, MD [place]   propranolol (INDERAL) 40 MG tablet Take 1 tablet by mouth 2 (Two) Times a Day. 4/12/22  Yes Sarah Bowers APRN   Rimegepant Sulfate (Nurtec) 75 MG tablet dispersible tablet Take 1 tablet by mouth Daily As Needed (migraine). 2/17/22  Yes Pippa Wilson APRN   rizatriptan MLT (MAXALT-MLT) 10 MG disintegrating tablet Place 1 tablet on the tongue 1 (One) Time As Needed for Migraine for up to 1 dose. May repeat in 2 hours if needed 10/5/22  Yes Pippa Wilson APRN   Toviaz 8 MG tablet sustained-release 24 hour tablet TAKE 1 TABLET BY MOUTH DAILY 4/11/22  Yes Sarah Bowers APRN   amitriptyline (ELAVIL) 10 MG tablet Take 1 tablet by mouth Every Night. 3/17/22 10/17/22 Yes Provider, MD [place]        Allergies:   Penicillins    Health Maintenance Due   Topic Date Due   • COVID-19 Vaccine (1) 05/16/1976   • ANNUAL PHYSICAL  Never done   • TDAP/TD VACCINES (1 - Tdap) Never done   • HEPATITIS C SCREENING  Never done   • PAP SMEAR  Never done           Vital Signs:     /92   Pulse 87   Temp 98.5 °F (36.9 °C)   Ht 170.2 cm (67\")   Wt 124 kg (273 lb)   SpO2 99%   BMI 42.76 kg/m²       Physical Exam  Vitals reviewed. "   Constitutional:       Appearance: Normal appearance. She is well-developed. She is obese.   HENT:      Head: Normocephalic and atraumatic.      Right Ear: External ear normal.      Left Ear: External ear normal.      Mouth/Throat:      Pharynx: No oropharyngeal exudate.   Eyes:      Conjunctiva/sclera: Conjunctivae normal.      Pupils: Pupils are equal, round, and reactive to light.   Cardiovascular:      Rate and Rhythm: Normal rate and regular rhythm.      Heart sounds: No murmur heard.    No friction rub. No gallop.   Pulmonary:      Effort: Pulmonary effort is normal.      Breath sounds: Normal breath sounds. No wheezing or rhonchi.   Abdominal:      General: Bowel sounds are normal. There is no distension.      Palpations: Abdomen is soft.      Tenderness: There is no abdominal tenderness.   Skin:     General: Skin is warm and dry.   Neurological:      Mental Status: She is alert and oriented to person, place, and time.      Cranial Nerves: No cranial nerve deficit.   Psychiatric:         Mood and Affect: Mood and affect normal.         Behavior: Behavior normal.         Thought Content: Thought content normal.         Judgment: Judgment normal.          Result Review :     Common labs    Common Labs 1/4/22 1/4/22 4/8/22 4/8/22 4/8/22    1159 1159 1304 1304 1304   Glucose 116 (A)    107 (A)   BUN 19    18   Creatinine 0.84    0.77   eGFR Non African Am 74       Sodium 140    135 (A)   Potassium 4.1    4.1   Chloride 107    104   Calcium 9.4    9.2   Albumin 4.50    4.50   Total Bilirubin <0.2    0.2   Alkaline Phosphatase 75    59   AST (SGOT) 23    25   ALT (SGPT) 18    18   Total Cholesterol  161 153     Triglycerides  295 (A) 249 (A)     HDL Cholesterol  28 (A) 21 (A)     LDL Cholesterol   84 90     Hemoglobin A1C    5.60    (A) Abnormal value                        Assessment and Plan    Diagnoses and all orders for this visit:    1. Essential hypertension (Primary)  Assessment & Plan:  Hypertension is  worsening.  Dietary sodium restriction.  Weight loss.  Stop smoking.  Medication changes per orders.  Blood pressure will be reassessed in 4 weeks.    Continue taking propanolol 40 mg twice daily.  Start taking hydrochlorothiazide 12.5 mg daily.    Orders:  -     CBC & Differential  -     Comprehensive Metabolic Panel  -     Lipid Panel  -     Thyroid Panel With TSH  -     propranolol (INDERAL) 40 MG tablet; Take 1 tablet by mouth 2 (Two) Times a Day.  Dispense: 180 tablet; Refill: 1  -     hydroCHLOROthiazide (HYDRODIURIL) 12.5 MG tablet; Take 1 tablet by mouth Daily.  Dispense: 30 tablet; Refill: 2    2. Mixed hyperlipidemia  Assessment & Plan:  Lipid abnormalities are improving with treatment.  Nutritional counseling was provided. and Pharmacotherapy as ordered.  Lipids will be reassessed in 3 months.    Orders:  -     Comprehensive Metabolic Panel  -     Lipid Panel  -     Thyroid Panel With TSH  -     fenofibrate (TRICOR) 145 MG tablet; Take 1 tablet by mouth Daily.  Dispense: 30 tablet; Refill: 5    3. Hypertonicity of bladder  -     fesoterodine fumarate (Toviaz) 8 MG tablet sustained-release 24 hour tablet; Take 1 tablet by mouth Daily.  Dispense: 30 tablet; Refill: 5    4. Intractable chronic migraine without aura and without status migrainosus  Comments:  Follow-up with neurology for continued treatment.  Continue use of Maxalt as needed for migraines.    5. Chronic neck pain  Comments:  Follow-up with pain management for continued treatment.    6. Class 3 severe obesity due to excess calories with serious comorbidity and body mass index (BMI) of 40.0 to 44.9 in adult (HCC)  Assessment & Plan:  Patient's (Body mass index is 42.76 kg/m².) indicates that they are morbidly obese (BMI > 40 or > 35 with obesity - related health condition) with health conditions that include hypertension and dyslipidemias . Weight is unchanged. BMI is is above average; BMI management plan is completed. We discussed portion  control and increasing exercise.       7. Vaccine for streptococcus pneumoniae and influenza  -     FluLaval/Fluzone >6 mos (8351-4565)  -     Pneumococcal Conjugate Vaccine 20-Valent (PCV20)    8. Impaired fasting glucose  -     Hemoglobin A1c  -     Thyroid Panel With TSH    9. Encounter for screening mammogram for malignant neoplasm of breast  -     Mammo Screening Digital Tomosynthesis Bilateral With CAD; Future         Patient was started on hydrochlorothiazide 12.5 mg daily.  She will follow-up with me in about 1 month to monitor blood pressure after starting this additional medication.  She was encouraged to schedule a follow-up appointment with OB/GYN to repeat Pap smear given her history of positive HPV.  She will be called to schedule a screening mammogram.    Smoking Cessation:    Debi Schroeder  reports that she has been smoking cigarettes. She started smoking about 27 years ago. She has a 26.00 pack-year smoking history. She has never used smokeless tobacco.. I have educated her on the risk of diseases from using tobacco products such as cancer, COPD and heart disease.     I advised her to quit and she is not willing to quit.    I spent 4 minutes counseling the patient.          Follow Up   Return in about 1 month (around 11/17/2022) for Recheck BP.  Patient was given instructions and counseling regarding her condition or for health maintenance advice. Please see specific information pulled into the AVS if appropriate.

## 2022-10-17 NOTE — ASSESSMENT & PLAN NOTE
Hypertension is worsening.  Dietary sodium restriction.  Weight loss.  Stop smoking.  Medication changes per orders.  Blood pressure will be reassessed in 4 weeks.    Continue taking propanolol 40 mg twice daily.  Start taking hydrochlorothiazide 12.5 mg daily.

## 2022-10-18 DIAGNOSIS — E78.2 MIXED HYPERLIPIDEMIA: ICD-10-CM

## 2022-10-18 LAB
ALBUMIN SERPL-MCNC: 5.1 G/DL (ref 3.5–5.2)
ALBUMIN/GLOB SERPL: 1.4 G/DL
ALP SERPL-CCNC: 78 U/L (ref 39–117)
ALT SERPL W P-5'-P-CCNC: 22 U/L (ref 1–33)
ANION GAP SERPL CALCULATED.3IONS-SCNC: 15.5 MMOL/L (ref 5–15)
AST SERPL-CCNC: 30 U/L (ref 1–32)
BILIRUB SERPL-MCNC: 0.2 MG/DL (ref 0–1.2)
BUN SERPL-MCNC: 21 MG/DL (ref 6–20)
BUN/CREAT SERPL: 21.4 (ref 7–25)
CALCIUM SPEC-SCNC: 9.9 MG/DL (ref 8.6–10.5)
CHLORIDE SERPL-SCNC: 99 MMOL/L (ref 98–107)
CO2 SERPL-SCNC: 22.5 MMOL/L (ref 22–29)
CREAT SERPL-MCNC: 0.98 MG/DL (ref 0.57–1)
EGFRCR SERPLBLD CKD-EPI 2021: 73.1 ML/MIN/1.73
GLOBULIN UR ELPH-MCNC: 3.6 GM/DL
GLUCOSE SERPL-MCNC: 85 MG/DL (ref 65–99)
HBA1C MFR BLD: 5.7 % (ref 4.8–5.6)
POTASSIUM SERPL-SCNC: 4.4 MMOL/L (ref 3.5–5.2)
PROT SERPL-MCNC: 8.7 G/DL (ref 6–8.5)
SODIUM SERPL-SCNC: 137 MMOL/L (ref 136–145)

## 2022-10-28 ENCOUNTER — HOSPITAL ENCOUNTER (OUTPATIENT)
Dept: MAMMOGRAPHY | Facility: HOSPITAL | Age: 47
Discharge: HOME | End: 2022-10-28

## 2022-10-28 DIAGNOSIS — Z12.31 ENCOUNTER FOR SCREENING MAMMOGRAM FOR MALIGNANT NEOPLASM OF BREAST: ICD-10-CM

## 2022-10-28 PROCEDURE — 77067 SCR MAMMO BI INCL CAD: CPT

## 2022-10-28 PROCEDURE — 77063 BREAST TOMOSYNTHESIS BI: CPT

## 2022-11-09 RX ORDER — ALBUTEROL SULFATE 90 UG/1
2 AEROSOL, METERED RESPIRATORY (INHALATION)
Qty: 18 G | Refills: 5 | Status: SHIPPED | OUTPATIENT
Start: 2022-11-09 | End: 2023-03-29

## 2022-11-09 RX ORDER — IBUPROFEN 800 MG/1
800 TABLET ORAL EVERY 8 HOURS PRN
Qty: 90 TABLET | Refills: 5 | OUTPATIENT
Start: 2022-11-09

## 2022-11-18 ENCOUNTER — TELEPHONE (OUTPATIENT)
Dept: NEUROLOGY | Facility: CLINIC | Age: 47
End: 2022-11-18

## 2022-11-21 ENCOUNTER — PATIENT MESSAGE (OUTPATIENT)
Dept: NEUROLOGY | Facility: CLINIC | Age: 47
End: 2022-11-21

## 2022-11-21 NOTE — TELEPHONE ENCOUNTER
She can have appt on 12/9 at 0945. This is the only time available that day as of right now.  If she does not keep that appt, I will not be willing to continue botox on her due to multiple no shows and cancellations

## 2022-12-22 DIAGNOSIS — I10 ESSENTIAL HYPERTENSION: ICD-10-CM

## 2022-12-22 RX ORDER — PROPRANOLOL HYDROCHLORIDE 40 MG/1
TABLET ORAL
Qty: 180 TABLET | Refills: 1 | Status: SHIPPED | OUTPATIENT
Start: 2022-12-22

## 2023-01-25 NOTE — PROGRESS NOTES
Progress Note      Patient Name: Debi Schroeder   Patient ID: [id number]   Sex: Female   YOB: 1975    Primary Care Provider: Sarah RYAN   Referring Provider: Sarah RYAN    Visit Date: September 1, 2020    Provider: CONNOR Sommers   Location: [facility]   Location Address: [address] [place] [address], [address]  [id number]   Location Phone: [phone]          Chief Complaint  · 3 month follow up      History Of Present Illness  Debi Schroeder is a 44 year old /White female who presents for evaluation and treatment of:      3-month follow-up.    History of depression/anxiety: She had been having increasing anxiety which was not well controlled.  Her Effexor dose was increased last visit but she states she stopped taking it because she did not like the way it made her feel.  She states she is doing fairly well with her anxiety but does have episodes of anxiety.  She denies taking hydroxyzine which is on her med list.    Overactive bladder: She is still not well controlled with her urge incontinence.  We had increased her Detrol dose last visit and she states it is not helped at all.  She states she used to be on Myrbetriq which controlled her overactive bladder.    Migraines: We started her on rizatriptan as needed migraines and she states this is working well.    History of chronic right ankle pain: She is still complaining of chronic pain, worse after she has been on her feet all day.  She complains of swelling in the ankle at the end of the day.  She states she does wear a brace sometimes when her ankle is hurting more.  She takes over-the-counter NSAIDs often.  Her x-ray showed an old avulsion fracture of the distal fibula.    She has a history of diabetes that has been controlled with diet.  Most recently her A1c did increase to 6.2%.  She states she used to be on metformin when she was first diagnosed.    History of  hydradenitis suppurative of the left axilla: She was on doxycycline for 4 months but states she had noticed that she was having some hair loss and she has been very fatigued so she stopped taking it.  She states that her hair loss has improved but she is still very fatigued.  She would like to have some labs checked today.       Past Medical History  Disease Name Date Onset Notes   Abdominal pain --  --    Allergic rhinitis due to allergen 03/23/2020 --    Arthritis --  --    Bladder problem --  --    COPD --  --    Degenerative Disc Disease  --  --    Depression with anxiety --  --    Dysmenorrhea 06/26/2020 --    Essential hypertension 03/23/2020 --    Herniated Disc --  --    Hidradenitis suppurativa --  --    Hidradenitis suppurativa of left axilla 03/23/2020 --    HPV (human papilloma virus) infection --  --    Hyperlipidemia --  --    Hypertension, Benign Essential --  --    Impaired fasting glucose 03/23/2020 --    Leg pain --  --    Leg swelling --  --    Migraines --  --    Neuropathy --  --    OAB (overactive bladder) 03/23/2020 --    Pain management --  --    PTSD (post-traumatic stress disorder) --  --    Spinal stenosis --  --    Thyroid nodule --  --          Past Surgical History  Procedure Name Date Notes   Bladder Surgery 1987 or 1988 --    Cholecstectomy 2019 --    Kidney Surgery 87 or 88 --          Medication List  Name Date Started Instructions   albuterol sulfate 90 mcg/actuation inhalation HFA aerosol inhaler 06/26/2020 INHALE 1 PUFF BY MOUTH EVERY 6 HOURS AS NEEDED   Detrol 2 mg oral tablet 06/26/2020 take 1 tablet (2 mg) by oral route 2 times per day for 30 days   fexofenadine 180 mg oral tablet 06/26/2020 take 1 tablet (180 mg) by oral route once daily for 30 days   hydroxyzine HCl 10 mg oral tablet 09/01/2020 take 1-2 tablets by oral route 2 times a day as needed for 30 days   oxycodone 10 mg oral tablet  take 2 tablets by oral route daily   propranolol 40 mg oral tablet 06/26/2020 TAKE 1  TABLET (40 MG) BY ORAL ROUTE 2 TIMES PER DAY FOR 90 DAYS   rizatriptan 10 mg oral tablet,disintegrating 2020 Place 1 tab on top of tongue to dissolve, may repeat at 2 hour intervals; do not exceed 30 mg in 24 hours   tizanidine 4 mg oral tablet 2020 TAKE 1 TABLET (4 MG) BY ORAL ROUTE EVERY 8 HOURS AS NEEDED FOR 30 DAYS   vitamin B12-folic acid 1,000-400 mcg sublingual lozenge  --          Allergy List  Allergen Name Date Reaction Notes   PENICILLINS --  --  --          Family Medical History  Disease Name Relative/Age Notes   Family history of Arthritis Father/  Mother/   Mother; Father   Family history of heart disease Mother/   --    Family history of diabetes mellitus Mother/   Maternal grandparent/Paternal grandparent         Reproductive History  Menstrual   Last Menstrual Period: 2019   Pregnancy Summary   Total Pregnancies: 0 Full Term: 0 Premature: 0   Ab Induced: 0 Ab Spontaneous: 0 Ectopics: 0   Multiples: 0 Livin         Social History  Finding Status Start/Stop Quantity Notes   Alcohol Never --/-- --  does not drink, less than 1 drink per day, has been drinking for less than 1 year   Tobacco Current every day --/-- 1 pk/day --    Unemployed --  --/-- --  --          Immunizations  NameDate Admin Mfg Trade Name Lot Number Route Inj VIS Given VIS Publication   Edenuwzay16/15/2019 [facility] Fluzone Quadrivalent [id number] IM LA 02/15/2019 2015   Comments:          Review of Systems  · Constitutional  o Admits  o : fatigue  o Denies  o : fever, weight loss, weight gain  · Cardiovascular  o Denies  o : lower extremity edema, claudication, chest pressure, palpitations  · Respiratory  o Denies  o : shortness of breath, wheezing, cough, hemoptysis, dyspnea on exertion  · Gastrointestinal  o Denies  o : nausea, vomiting, diarrhea, constipation, abdominal pain  · Genitourinary  o Admits  o : urgency, frequency, incontinence  o Denies  o : dysuria  · Musculoskeletal  o Admits  o : joint swelling,  "ankle pain  · Endocrine  o Admits  o : loss of hair, central obesity  o Denies  o : cold intolerance  · Psychiatric  o Admits  o : anxiety  o Denies  o : depression, suicidal ideation, homicidal ideation      Vitals  Date Time BP Position Site L\R Cuff Size HR RR TEMP (F) WT  HT  BMI kg/m2 BSA m2 O2 Sat HC       09/01/2020 11:18 /92 Sitting    84 - R  97.3 267lbs 0oz 5'  7\" 41.82 2.39 95 %          Physical Examination  · Constitutional  o Appearance  o : no acute distress, well-nourished  · Head and Face  o Head  o :   § Inspection  § : atraumatic, normocephalic  · Neck  o Thyroid  o : gland size normal, nontender, no nodules or masses present on palpation, symmetric  · Respiratory  o Respiratory Effort  o : breathing comfortably, symmetric chest rise  o Auscultation of Lungs  o : clear to asculatation bilaterally, no wheezes, rales, or rhonchii  · Cardiovascular  o Heart  o :   § Auscultation of Heart  § : regular rate and rhythm, no murmurs, rubs, or gallops  o Peripheral Vascular System  o :   § Extremities  § : no edema  · Lymphatic  o Neck  o : no lymphadenopathy present  · Neurologic  o Mental Status Examination  o :   § Orientation  § : grossly oriented to person, place and time  o Gait and Station  o :   § Gait Screening  § : normal gait  · Psychiatric  o General  o : normal mood and affect  o Presence of Abnormal Thoughts  o : no hallucinations, no delusions present, no psychotic thoughts, no homicidal ideation, no suicidal ideation, no evidence of obsessional thinking          Assessment  · Fatigue     780.79/R53.83  · Impaired fasting glucose     790.21/R73.01  · OAB (overactive bladder)     596.51/N32.81  Not well controlled on Detrol, will start her back on Myrbetriq.  · Depression with anxiety     300.4/F41.8  Stable, will refill her hydroxyzine  · Migraines     346.90/G43.909  Controlled on rizatriptan  · Chronic pain of right ankle       Pain in right ankle and joints of right " foot     719.47/M25.571  Other chronic pain     719.47/G89.29  Refer her to podiatry due to her chronic right ankle pain  · History of avulsion fracture RT distal fibula     V15.51/Z87.81    Problems Reconciled  Plan  · Orders  o CBC with Auto Diff [facility] ([id number]) - 780.79/R53.83 - 09/01/2020  o CMP [facility] ([id number]) - 780.79/R53.83 - 09/01/2020  o Thyroid Profile (63950, 00785, THYII) - 780.79/R53.83 - 09/01/2020  o B12 Folate levels (B12FO) - 780.79/R53.83 - 09/01/2020  o Hgb A1c [facility] ([id number]) - 790.21/R73.01 - 09/01/2020  o ACO-39: Current medications updated and reviewed () - - 09/01/2020  o PODIATRY CONSULTATION (PODIA) - 719.47/M25.571, 719.47/G89.29, V15.51/Z87.81 - 09/01/2020   Dr. Quinn  · Medications  o Myrbetriq 50 mg oral tablet extended release 24 hr   SIG: take 1 tablet (50 mg) by oral route once daily swallowing whole with water. Do not crush, chew and/or divide. for 30 days   DISP: (30) tablets with 5 refills  Prescribed on 09/01/2020     o hydroxyzine HCl 10 mg oral tablet   SIG: take 1-2 tablets by oral route 2 times a day as needed for 30 days   DISP: (60) tablets with 2 refills  Adjusted on 09/01/2020     o Effexor XR 75 mg oral capsule,extended release 24hr   SIG: take 1 capsule (75 mg) by oral route once daily with food for 30 days   DISP: (30) capsules with 5 refills  Discontinued on 09/01/2020     o Medications have been Reconciled  o Transition of Care or Provider Policy  · Instructions  o Instructed patient to watch their diet and exercise.  o Patient was educated/instructed on their diagnosis, treatment and medications prior to discharge from the clinic today.  o Patient instructed to seek medical attention urgently for new or worsening symptoms.  o Call the office with any concerns or questions.  · Disposition  o Return to clinic in 3 months            Electronically Signed by: Sarah Bowers APRN -Author on September 1, 2020 12:40:11 PM   RAD

## 2023-02-21 RX ORDER — CETIRIZINE HYDROCHLORIDE 10 MG/1
TABLET ORAL
Qty: 30 TABLET | OUTPATIENT
Start: 2023-02-21

## 2023-03-29 RX ORDER — ALBUTEROL SULFATE 90 UG/1
AEROSOL, METERED RESPIRATORY (INHALATION)
Qty: 18 G | Refills: 5 | Status: SHIPPED | OUTPATIENT
Start: 2023-03-29

## 2023-04-16 DIAGNOSIS — N31.8 HYPERTONICITY OF BLADDER: ICD-10-CM

## 2023-04-17 RX ORDER — FESOTERODINE FUMARATE 8 MG/1
8 TABLET, EXTENDED RELEASE ORAL
Qty: 30 TABLET | Refills: 5 | Status: SHIPPED | OUTPATIENT
Start: 2023-04-17

## 2023-04-28 RX ORDER — RIZATRIPTAN BENZOATE 10 MG/1
TABLET, ORALLY DISINTEGRATING ORAL
Qty: 9 TABLET | Refills: 3 | OUTPATIENT
Start: 2023-04-28

## 2023-05-09 ENCOUNTER — OFFICE VISIT (OUTPATIENT)
Dept: FAMILY MEDICINE CLINIC | Facility: CLINIC | Age: 48
End: 2023-05-09
Payer: COMMERCIAL

## 2023-05-09 VITALS
HEIGHT: 67 IN | WEIGHT: 259.8 LBS | TEMPERATURE: 98.6 F | BODY MASS INDEX: 40.78 KG/M2 | OXYGEN SATURATION: 98 % | SYSTOLIC BLOOD PRESSURE: 140 MMHG | HEART RATE: 76 BPM | DIASTOLIC BLOOD PRESSURE: 88 MMHG

## 2023-05-09 DIAGNOSIS — Z23 NEED FOR TDAP VACCINATION: ICD-10-CM

## 2023-05-09 DIAGNOSIS — E78.2 MIXED HYPERLIPIDEMIA: ICD-10-CM

## 2023-05-09 DIAGNOSIS — Z00.00 ANNUAL PHYSICAL EXAM: Primary | ICD-10-CM

## 2023-05-09 DIAGNOSIS — Z12.11 COLON CANCER SCREENING: ICD-10-CM

## 2023-05-09 DIAGNOSIS — Z11.59 NEED FOR HEPATITIS C SCREENING TEST: ICD-10-CM

## 2023-05-09 DIAGNOSIS — I10 ESSENTIAL HYPERTENSION: ICD-10-CM

## 2023-05-09 DIAGNOSIS — E66.01 CLASS 3 SEVERE OBESITY DUE TO EXCESS CALORIES WITH SERIOUS COMORBIDITY AND BODY MASS INDEX (BMI) OF 40.0 TO 44.9 IN ADULT: ICD-10-CM

## 2023-05-09 DIAGNOSIS — R73.03 PREDIABETES: ICD-10-CM

## 2023-05-09 LAB
ALBUMIN SERPL-MCNC: 4.6 G/DL (ref 3.5–5.2)
ALBUMIN/GLOB SERPL: 1.4 G/DL
ALP SERPL-CCNC: 71 U/L (ref 39–117)
ALT SERPL W P-5'-P-CCNC: 19 U/L (ref 1–33)
ANION GAP SERPL CALCULATED.3IONS-SCNC: 13 MMOL/L (ref 5–15)
AST SERPL-CCNC: 25 U/L (ref 1–32)
BASOPHILS # BLD AUTO: 0.06 10*3/MM3 (ref 0–0.2)
BASOPHILS NFR BLD AUTO: 0.6 % (ref 0–1.5)
BILIRUB SERPL-MCNC: 0.3 MG/DL (ref 0–1.2)
BUN SERPL-MCNC: 19 MG/DL (ref 6–20)
BUN/CREAT SERPL: 23.2 (ref 7–25)
CALCIUM SPEC-SCNC: 10 MG/DL (ref 8.6–10.5)
CHLORIDE SERPL-SCNC: 106 MMOL/L (ref 98–107)
CHOLEST SERPL-MCNC: 143 MG/DL (ref 0–200)
CO2 SERPL-SCNC: 20 MMOL/L (ref 22–29)
CREAT SERPL-MCNC: 0.82 MG/DL (ref 0.57–1)
DEPRECATED RDW RBC AUTO: 43.3 FL (ref 37–54)
EGFRCR SERPLBLD CKD-EPI 2021: 90 ML/MIN/1.73
EOSINOPHIL # BLD AUTO: 0.29 10*3/MM3 (ref 0–0.4)
EOSINOPHIL NFR BLD AUTO: 2.7 % (ref 0.3–6.2)
ERYTHROCYTE [DISTWIDTH] IN BLOOD BY AUTOMATED COUNT: 12.4 % (ref 12.3–15.4)
GLOBULIN UR ELPH-MCNC: 3.2 GM/DL
GLUCOSE SERPL-MCNC: 88 MG/DL (ref 65–99)
HBA1C MFR BLD: 5.5 % (ref 4.8–5.6)
HCT VFR BLD AUTO: 42.2 % (ref 34–46.6)
HCV AB SER DONR QL: NORMAL
HDLC SERPL-MCNC: 25 MG/DL (ref 40–60)
HGB BLD-MCNC: 14.6 G/DL (ref 12–15.9)
IMM GRANULOCYTES # BLD AUTO: 0.03 10*3/MM3 (ref 0–0.05)
IMM GRANULOCYTES NFR BLD AUTO: 0.3 % (ref 0–0.5)
LDLC SERPL CALC-MCNC: 80 MG/DL (ref 0–100)
LDLC/HDLC SERPL: 2.9 {RATIO}
LYMPHOCYTES # BLD AUTO: 2.83 10*3/MM3 (ref 0.7–3.1)
LYMPHOCYTES NFR BLD AUTO: 26.3 % (ref 19.6–45.3)
MCH RBC QN AUTO: 32.7 PG (ref 26.6–33)
MCHC RBC AUTO-ENTMCNC: 34.6 G/DL (ref 31.5–35.7)
MCV RBC AUTO: 94.4 FL (ref 79–97)
MONOCYTES # BLD AUTO: 0.85 10*3/MM3 (ref 0.1–0.9)
MONOCYTES NFR BLD AUTO: 7.9 % (ref 5–12)
NEUTROPHILS NFR BLD AUTO: 6.69 10*3/MM3 (ref 1.7–7)
NEUTROPHILS NFR BLD AUTO: 62.2 % (ref 42.7–76)
NRBC BLD AUTO-RTO: 0 /100 WBC (ref 0–0.2)
PLATELET # BLD AUTO: 330 10*3/MM3 (ref 140–450)
PMV BLD AUTO: 11.5 FL (ref 6–12)
POTASSIUM SERPL-SCNC: 4.2 MMOL/L (ref 3.5–5.2)
PROT SERPL-MCNC: 7.8 G/DL (ref 6–8.5)
RBC # BLD AUTO: 4.47 10*6/MM3 (ref 3.77–5.28)
SODIUM SERPL-SCNC: 139 MMOL/L (ref 136–145)
TRIGL SERPL-MCNC: 227 MG/DL (ref 0–150)
VLDLC SERPL-MCNC: 38 MG/DL (ref 5–40)
WBC NRBC COR # BLD: 10.75 10*3/MM3 (ref 3.4–10.8)

## 2023-05-09 PROCEDURE — 80053 COMPREHEN METABOLIC PANEL: CPT

## 2023-05-09 PROCEDURE — 80061 LIPID PANEL: CPT

## 2023-05-09 PROCEDURE — 83036 HEMOGLOBIN GLYCOSYLATED A1C: CPT

## 2023-05-09 PROCEDURE — 85025 COMPLETE CBC W/AUTO DIFF WBC: CPT

## 2023-05-09 PROCEDURE — 86803 HEPATITIS C AB TEST: CPT

## 2023-05-09 RX ORDER — CETIRIZINE HYDROCHLORIDE 10 MG/1
10 TABLET ORAL DAILY
Qty: 90 TABLET | Refills: 1 | Status: SHIPPED | OUTPATIENT
Start: 2023-05-09

## 2023-05-09 RX ORDER — FENOFIBRATE 145 MG/1
145 TABLET, COATED ORAL DAILY
Qty: 30 TABLET | Refills: 5 | Status: SHIPPED | OUTPATIENT
Start: 2023-05-09

## 2023-05-09 RX ORDER — AMLODIPINE BESYLATE 5 MG/1
5 TABLET ORAL DAILY
Qty: 30 TABLET | Refills: 5 | Status: SHIPPED | OUTPATIENT
Start: 2023-05-09

## 2023-05-09 NOTE — ASSESSMENT & PLAN NOTE
Patient's (Body mass index is 40.69 kg/m².) indicates that they are morbidly/severely obese (BMI > 40 or > 35 with obesity - related health condition) with health conditions that include hypertension and dyslipidemias . Weight is unchanged. BMI  is above average; BMI management plan is completed. We discussed portion control and increasing exercise.

## 2023-05-09 NOTE — ASSESSMENT & PLAN NOTE
Hypertension is worsening.  Dietary sodium restriction.  Weight loss.  Stop smoking.  Medication changes per orders.  Ambulatory blood pressure monitoring.  Blood pressure will be reassessed in 4 weeks.  Begin use of amlodipine 5 mg daily in addition to propranolol 40 mg twice daily.  Monitor blood pressures at home regularly and return with blood pressure log.  Discussed with patient that blood pressure goal is 110-120/70-80.  If she begins experiencing lower readings, dizziness, excessive fatigue patient will notify me for medication adjustments.

## 2023-05-09 NOTE — PROGRESS NOTES
Chief Complaint  Chief Complaint   Patient presents with   • Hypertension     Follow up    • Hyperlipidemia     Follow up        Subjective      Debi Schroeder presents to [facility]  History of Present Illness  Patient presents today to follow-up on hypertension and hyperlipidemia.      Hypertension: She was last seen in October 2022 and advised to follow-up in 1 month.  At that time she was provided refills for propranolol 40 mg twice daily and initiated on hydrochlorothiazide 12.5 mg daily.    She states that she did not continue taking that as it causes extreme fatigue.  She is hypertensive on evaluation today with a blood pressure of 140/88. She doesn't check her BP at home but it is monitored and comparable at follow up visits with pain management. She has never taken any BP medications prior to initiation of HCTZ.     Hyperlipidemia: Previous lipid panel showed slight elevation in triglycerides. She is taking Fenofibrate daily.     Migraines: She has a history of migraines and had previously been seeing neurology, CONNOR Sevilla.  She is no longer seeing neurology and is receiving Botox injections through pain management.      Chronic neck pain: She sees [facility]. She had an ablation done on the right side within the last month or so and has a left sided ablation scheduled on 5/19/2023. They are also doing botox injections for migraine management. She has noticed an improvement in migraines since her recent botox injection.     She has never had colon cancer screening.  She denies a family history of colon cancer, change in bowel habits, blood in stool.    Pap smear was done a little over a year ago with placement of an IUD. She did not follow up after initial placement of IUD due to scheduling conflicts with [facility]. She has a history of HPV.     Objective     Medical History:  Past Medical History:   Diagnosis Date   • Abdominal pain    • Allergic     • Allergic rhinitis due to allergen 2020   • Arthritis    • Bladder problem    • COPD (chronic obstructive pulmonary disease)    • CTS (carpal tunnel syndrome)     Just some pain, no surgery's   • DDD (degenerative disc disease), lumbar    • Depression with anxiety    • Difficulty walking 2021    Do to my back injury   • Dysmenorrhea 2020   • Essential hypertension 2020   • Essential hypertension, benign    • Fibromyalgia, primary 2020    I was told I may have fibromyalgia   • Headache, tension-type Late 90s    I've been getting them done my late teens   • Hidradenitis suppurativa    • Hidradenitis suppurativa of left axilla 2020   • HPV (human papilloma virus) infection    • Hyperlipidemia    • IBS (irritable bowel syndrome)    • Impaired fasting glucose 2020   • Ingrown toenail    • Leg pain    • Leg swelling    • Low back pain    • Lumbar herniated disc    • Migraines    • Neuropathy    • Numbness in feet    • OAB (overactive bladder) 2020   • Pain management    • PTSD (post-traumatic stress disorder)    • Shingles    • Spinal stenosis    • Thyroid nodule      Past Surgical History:   Procedure Laterality Date   • BLADDER SURGERY      or    • CHOLECYSTECTOMY     • KIDNEY SURGERY      or       Social History     Tobacco Use   • Smoking status: Every Day     Packs/day: 1.00     Years: 26.00     Pack years: 26.00     Types: Cigarettes     Start date: 1995     Last attempt to quit: 2021     Years since quittin.4   • Smokeless tobacco: Never   • Tobacco comments:     Ddd   Vaping Use   • Vaping Use: Never used   Substance Use Topics   • Alcohol use: Never     Comment: less than 1 drink per day, has been drinking less than one year   • Drug use: Never     Family History   Problem Relation Age of Onset   • Arthritis Mother    • Heart disease Mother    • Diabetes Mother    • Depression Mother    • Hyperlipidemia Mother    •  Hypertension Mother    • Arthritis Father    • Drug abuse Father    • Hyperlipidemia Father    • Hypertension Father    • Diabetes Maternal Grandmother    • Diabetes Paternal Grandmother         Unspecified       Medications:  Prior to Admission medications    Medication Sig Start Date End Date Taking? Authorizing Provider   cetirizine (zyrTEC) 10 MG tablet TAKE 1 TABLET BY MOUTH EVERY DAY 10/13/22   Berta Tucker APRN   fenofibrate (TRICOR) 145 MG tablet Take 1 tablet by mouth Daily. 10/17/22   Berta Tucker APRN   fesoterodine fumarate (TOVIAZ ER) 8 MG tablet sustained-release 24 hour tablet TAKE 1 TABLET BY MOUTH DAILY 4/17/23   Berta Tucker APRN   fluticasone (FLONASE) 50 MCG/ACT nasal spray SHAKE LIQUID AND USE 1 TO 2 SPRAYS IN EACH NOSTRIL EVERY DAY 7/26/21   Aurora Bro MD   hydroCHLOROthiazide (HYDRODIURIL) 12.5 MG tablet Take 1 tablet by mouth Daily. 10/17/22   Berta Tucker APRN   ibuprofen (ADVIL,MOTRIN) 800 MG tablet TAKE 1 TABLET BY MOUTH EVERY 8 HOURS AS NEEDED FOR MILD PAIN 4/6/22   Sarah Bowers APRN   methocarbamol (ROBAXIN) 750 MG tablet methocarbamol 750 mg oral tablet take 1 tablet (750 mg) by oral route 3 times per day   Active    Provider, MD Aurora   milnacipran (SAVELLA) 25 MG tablet tablet  7/14/22   ProviderAurora MD   oxyCODONE-acetaminophen (PERCOCET) 7.5-325 MG per tablet Percocet 7.5-325 mg oral tablet take 1 tablet by oral route every 6 hours as needed   Active    Provider, MD Aurora   propranolol (INDERAL) 40 MG tablet TAKE 1 TABLET BY MOUTH TWICE DAILY 12/22/22   Berta Tucker APRN   Rimegepant Sulfate (Nurtec) 75 MG tablet dispersible tablet Take 1 tablet by mouth Daily As Needed (migraine). 2/17/22   Pippa Wilson APRN   rizatriptan MLT (MAXALT-MLT) 10 MG disintegrating tablet Place 1 tablet on the tongue 1 (One) Time As Needed for Migraine for up to 1 dose. May repeat in 2 hours if needed  "10/5/22   Pippa Wilson APRN   Ventolin  (90 Base) MCG/ACT inhaler INHALE 2 PUFFS FOUR TIMES DAILY 3/29/23   Berta Tucker APRN        Allergies:   Penicillins    Health Maintenance Due   Topic Date Due   • COLORECTAL CANCER SCREENING  Never done   • TDAP/TD VACCINES (1 - Tdap) Never done   • HEPATITIS C SCREENING  Never done   • ANNUAL PHYSICAL  Never done   • PAP SMEAR  Never done         Vital Signs:   /88   Pulse 76   Temp 98.6 °F (37 °C)   Ht 170.2 cm (67\")   Wt 118 kg (259 lb 12.8 oz)   SpO2 98%   BMI 40.69 kg/m²     Wt Readings from Last 3 Encounters:   05/09/23 118 kg (259 lb 12.8 oz)   10/17/22 124 kg (273 lb)   04/12/22 124 kg (273 lb 6.4 oz)     BP Readings from Last 3 Encounters:   05/09/23 140/88   10/17/22 160/92   04/12/22 136/84       Class 3 Severe Obesity (BMI >=40). Obesity-related health conditions include the following: hypertension and dyslipidemias. Obesity is unchanged. BMI is is above average; BMI management plan is completed. We discussed portion control and increasing exercise.       Physical Exam  Vitals reviewed.   Constitutional:       Appearance: Normal appearance. She is well-developed. She is obese.   HENT:      Head: Normocephalic and atraumatic.   Eyes:      Conjunctiva/sclera: Conjunctivae normal.      Pupils: Pupils are equal, round, and reactive to light.   Cardiovascular:      Rate and Rhythm: Normal rate and regular rhythm.      Heart sounds: No murmur heard.    No friction rub. No gallop.   Pulmonary:      Effort: Pulmonary effort is normal.      Breath sounds: Normal breath sounds. No wheezing or rhonchi.   Abdominal:      General: Bowel sounds are normal. There is no distension.      Palpations: Abdomen is soft.      Tenderness: There is no abdominal tenderness.   Skin:     General: Skin is warm and dry.   Neurological:      Mental Status: She is alert and oriented to person, place, and time.      Cranial Nerves: No cranial nerve deficit. "   Psychiatric:         Mood and Affect: Mood and affect normal.         Behavior: Behavior normal.         Thought Content: Thought content normal.         Judgment: Judgment normal.          Result Review :    The following data was reviewed by CONNOR Roman on 05/09/23 at 14:52 EDT:    Common labs        10/17/2022    12:26   Common Labs   Glucose 85     BUN 21     Creatinine 0.98     Sodium 137     Potassium 4.4     Chloride 99     Calcium 9.9     Albumin 5.10     Total Bilirubin 0.2     Alkaline Phosphatase 78     AST (SGOT) 30     ALT (SGPT) 22     WBC 15.34     Hemoglobin 15.5     Hematocrit 46.2     Platelets 379     Total Cholesterol 154     Triglycerides 221     HDL Cholesterol 32     LDL Cholesterol  85     Hemoglobin A1C 5.70         No Images in the past 120 days found..                  Assessment and Plan    Diagnoses and all orders for this visit:    1. Annual physical exam (Primary)  -     CBC & Differential  -     Comprehensive Metabolic Panel  -     Lipid Panel    2. Essential hypertension  Assessment & Plan:  Hypertension is worsening.  Dietary sodium restriction.  Weight loss.  Stop smoking.  Medication changes per orders.  Ambulatory blood pressure monitoring.  Blood pressure will be reassessed in 4 weeks.  Begin use of amlodipine 5 mg daily in addition to propranolol 40 mg twice daily.  Monitor blood pressures at home regularly and return with blood pressure log.  Discussed with patient that blood pressure goal is 110-120/70-80.  If she begins experiencing lower readings, dizziness, excessive fatigue patient will notify me for medication adjustments.    Orders:  -     amLODIPine (NORVASC) 5 MG tablet; Take 1 tablet by mouth Daily.  Dispense: 30 tablet; Refill: 5  -     CBC & Differential  -     Comprehensive Metabolic Panel  -     Lipid Panel    3. Mixed hyperlipidemia  Assessment & Plan:  Lipid abnormalities are improving with treatment.  Nutritional counseling was provided. and  Pharmacotherapy as ordered.  Lipids will be reassessed in 6 months.    Orders:  -     fenofibrate (TRICOR) 145 MG tablet; Take 1 tablet by mouth Daily.  Dispense: 30 tablet; Refill: 5  -     CBC & Differential  -     Comprehensive Metabolic Panel  -     Lipid Panel    4. Prediabetes  -     Hemoglobin A1c    5. Need for hepatitis C screening test  -     Hepatitis C antibody    6. Colon cancer screening  -     Cologuard - Stool, Per Rectum; Future    7. Need for Tdap vaccination  -     Tdap Vaccine Greater Than or Equal To 8yo IM    8. Class 3 severe obesity due to excess calories with serious comorbidity and body mass index (BMI) of 40.0 to 44.9 in adult  Assessment & Plan:  Patient's (Body mass index is 40.69 kg/m².) indicates that they are morbidly/severely obese (BMI > 40 or > 35 with obesity - related health condition) with health conditions that include hypertension and dyslipidemias . Weight is unchanged. BMI  is above average; BMI management plan is completed. We discussed portion control and increasing exercise.       Other orders  -     cetirizine (zyrTEC) 10 MG tablet; Take 1 tablet by mouth Daily.  Dispense: 90 tablet; Refill: 1    Patient was encouraged to schedule follow-up exam with OB/GYN for repeat Pap smear and IUD check.  Patient will continue current medications and stop HCTZ, begin amlodipine 5 mg daily for treatment of hypertension.  Lab work will be reevaluated today to ensure that triglycerides are improving.  Patient to follow-up with me in 1 month for continued monitoring of hypertension and hyperlipidemia.    Age-based counseling and anticipatory guidelines discussed during today's visit.        Smoking Cessation:    Debi Schroeder  reports that she has been smoking cigarettes. She started smoking about 28 years ago. She has a 26.00 pack-year smoking history. She has never used smokeless tobacco.. I have educated her on the risk of diseases from using tobacco products such as cancer, COPD  and heart disease.     I advised her to quit and she is not willing to quit.    I spent 3  minutes counseling the patient.            Follow Up   Return in about 1 month (around 6/9/2023) for Next scheduled follow up, Recheck.  Patient was given instructions and counseling regarding her condition or for health maintenance advice. Please see specific information pulled into the AVS if appropriate.     Please note that portions of this note were completed with a voice recognition program.

## 2023-10-10 ENCOUNTER — TELEPHONE (OUTPATIENT)
Dept: FAMILY MEDICINE CLINIC | Facility: CLINIC | Age: 48
End: 2023-10-10

## 2023-10-10 NOTE — TELEPHONE ENCOUNTER
Caller: Debi Schroeder    Relationship to patient: Self    Best call back number: [phone], CALL BEFORE  3:00 PM       Type of visit: FOLLOW UP, MED REFILLS     Requested date: ASAP     Additional notes: PATIENT IS CALLING REQUESTING FOR A FOLLOW UP, MED REFILL APPOINTMENT PRIOR TO INSURANCE COVERAGE ENDING, AND NEEDING ASAP.

## 2023-10-11 DIAGNOSIS — I10 ESSENTIAL HYPERTENSION: ICD-10-CM

## 2023-10-11 DIAGNOSIS — N31.8 HYPERTONICITY OF BLADDER: ICD-10-CM

## 2023-10-11 DIAGNOSIS — E78.2 MIXED HYPERLIPIDEMIA: ICD-10-CM

## 2023-10-11 RX ORDER — PROPRANOLOL HYDROCHLORIDE 40 MG/1
40 TABLET ORAL 2 TIMES DAILY
Qty: 180 TABLET | Refills: 0 | Status: SHIPPED | OUTPATIENT
Start: 2023-10-11

## 2023-10-11 RX ORDER — FESOTERODINE FUMARATE 8 MG/1
8 TABLET, EXTENDED RELEASE ORAL
Qty: 30 TABLET | Refills: 0 | Status: SHIPPED | OUTPATIENT
Start: 2023-10-11

## 2023-10-11 RX ORDER — FENOFIBRATE 145 MG/1
145 TABLET, COATED ORAL DAILY
Qty: 30 TABLET | Refills: 0 | Status: SHIPPED | OUTPATIENT
Start: 2023-10-11

## 2023-10-11 RX ORDER — CETIRIZINE HYDROCHLORIDE 10 MG/1
10 TABLET ORAL DAILY
Qty: 90 TABLET | Refills: 0 | Status: SHIPPED | OUTPATIENT
Start: 2023-10-11

## 2023-10-11 RX ORDER — ALBUTEROL SULFATE 90 UG/1
2 AEROSOL, METERED RESPIRATORY (INHALATION) 4 TIMES DAILY
Qty: 18 G | Refills: 5 | Status: SHIPPED | OUTPATIENT
Start: 2023-10-11

## 2023-10-25 ENCOUNTER — OFFICE VISIT (OUTPATIENT)
Dept: FAMILY MEDICINE CLINIC | Facility: CLINIC | Age: 48
End: 2023-10-25
Payer: COMMERCIAL

## 2023-10-25 VITALS
TEMPERATURE: 98.2 F | BODY MASS INDEX: 38.33 KG/M2 | HEART RATE: 85 BPM | WEIGHT: 244.2 LBS | HEIGHT: 67 IN | OXYGEN SATURATION: 96 % | SYSTOLIC BLOOD PRESSURE: 118 MMHG | DIASTOLIC BLOOD PRESSURE: 80 MMHG

## 2023-10-25 DIAGNOSIS — Z23 NEED FOR INFLUENZA VACCINATION: ICD-10-CM

## 2023-10-25 DIAGNOSIS — M79.7 FIBROMYALGIA: ICD-10-CM

## 2023-10-25 DIAGNOSIS — M25.50 POLYARTHRALGIA: ICD-10-CM

## 2023-10-25 DIAGNOSIS — E78.2 MIXED HYPERLIPIDEMIA: ICD-10-CM

## 2023-10-25 DIAGNOSIS — M79.10 MYALGIA: ICD-10-CM

## 2023-10-25 DIAGNOSIS — I10 ESSENTIAL HYPERTENSION: Primary | ICD-10-CM

## 2023-10-25 DIAGNOSIS — R53.83 FATIGUE, UNSPECIFIED TYPE: ICD-10-CM

## 2023-10-25 LAB
ALBUMIN SERPL-MCNC: 4.4 G/DL (ref 3.5–5.2)
ALBUMIN/GLOB SERPL: 1.5 G/DL
ALP SERPL-CCNC: 79 U/L (ref 39–117)
ALT SERPL W P-5'-P-CCNC: 27 U/L (ref 1–33)
ANION GAP SERPL CALCULATED.3IONS-SCNC: 11 MMOL/L (ref 5–15)
AST SERPL-CCNC: 32 U/L (ref 1–32)
BASOPHILS # BLD AUTO: 0.08 10*3/MM3 (ref 0–0.2)
BASOPHILS NFR BLD AUTO: 0.9 % (ref 0–1.5)
BILIRUB SERPL-MCNC: 0.2 MG/DL (ref 0–1.2)
BUN SERPL-MCNC: 18 MG/DL (ref 6–20)
BUN/CREAT SERPL: 20 (ref 7–25)
CALCIUM SPEC-SCNC: 9.4 MG/DL (ref 8.6–10.5)
CHLORIDE SERPL-SCNC: 108 MMOL/L (ref 98–107)
CHOLEST SERPL-MCNC: 140 MG/DL (ref 0–200)
CHROMATIN AB SERPL-ACNC: <10 IU/ML (ref 0–14)
CO2 SERPL-SCNC: 21 MMOL/L (ref 22–29)
CREAT SERPL-MCNC: 0.9 MG/DL (ref 0.57–1)
CRP SERPL-MCNC: 0.73 MG/DL (ref 0–0.5)
DEPRECATED RDW RBC AUTO: 45.5 FL (ref 37–54)
EGFRCR SERPLBLD CKD-EPI 2021: 80.5 ML/MIN/1.73
EOSINOPHIL # BLD AUTO: 0.38 10*3/MM3 (ref 0–0.4)
EOSINOPHIL NFR BLD AUTO: 4.3 % (ref 0.3–6.2)
ERYTHROCYTE [DISTWIDTH] IN BLOOD BY AUTOMATED COUNT: 12.8 % (ref 12.3–15.4)
FERRITIN SERPL-MCNC: 73.6 NG/ML (ref 13–150)
GLOBULIN UR ELPH-MCNC: 2.9 GM/DL
GLUCOSE SERPL-MCNC: 127 MG/DL (ref 65–99)
HCT VFR BLD AUTO: 40 % (ref 34–46.6)
HDLC SERPL-MCNC: 22 MG/DL (ref 40–60)
HGB BLD-MCNC: 13.7 G/DL (ref 12–15.9)
IMM GRANULOCYTES # BLD AUTO: 0.02 10*3/MM3 (ref 0–0.05)
IMM GRANULOCYTES NFR BLD AUTO: 0.2 % (ref 0–0.5)
IRON 24H UR-MRATE: 103 MCG/DL (ref 37–145)
IRON SATN MFR SERPL: 21 % (ref 20–50)
LDLC SERPL CALC-MCNC: 74 MG/DL (ref 0–100)
LDLC/HDLC SERPL: 2.92 {RATIO}
LYMPHOCYTES # BLD AUTO: 2.75 10*3/MM3 (ref 0.7–3.1)
LYMPHOCYTES NFR BLD AUTO: 30.9 % (ref 19.6–45.3)
MCH RBC QN AUTO: 33.1 PG (ref 26.6–33)
MCHC RBC AUTO-ENTMCNC: 34.3 G/DL (ref 31.5–35.7)
MCV RBC AUTO: 96.6 FL (ref 79–97)
MONOCYTES # BLD AUTO: 0.68 10*3/MM3 (ref 0.1–0.9)
MONOCYTES NFR BLD AUTO: 7.6 % (ref 5–12)
NEUTROPHILS NFR BLD AUTO: 5 10*3/MM3 (ref 1.7–7)
NEUTROPHILS NFR BLD AUTO: 56.1 % (ref 42.7–76)
NRBC BLD AUTO-RTO: 0 /100 WBC (ref 0–0.2)
PLATELET # BLD AUTO: 298 10*3/MM3 (ref 140–450)
PMV BLD AUTO: 12.7 FL (ref 6–12)
POTASSIUM SERPL-SCNC: 3.8 MMOL/L (ref 3.5–5.2)
PROT SERPL-MCNC: 7.3 G/DL (ref 6–8.5)
RBC # BLD AUTO: 4.14 10*6/MM3 (ref 3.77–5.28)
SODIUM SERPL-SCNC: 140 MMOL/L (ref 136–145)
TIBC SERPL-MCNC: 493 MCG/DL (ref 298–536)
TRANSFERRIN SERPL-MCNC: 331 MG/DL (ref 200–360)
TRIGL SERPL-MCNC: 269 MG/DL (ref 0–150)
URATE SERPL-MCNC: 4 MG/DL (ref 2.4–5.7)
VLDLC SERPL-MCNC: 44 MG/DL (ref 5–40)
WBC NRBC COR # BLD: 8.91 10*3/MM3 (ref 3.4–10.8)

## 2023-10-25 PROCEDURE — 80053 COMPREHEN METABOLIC PANEL: CPT

## 2023-10-25 PROCEDURE — 83540 ASSAY OF IRON: CPT

## 2023-10-25 PROCEDURE — 82728 ASSAY OF FERRITIN: CPT

## 2023-10-25 PROCEDURE — 86431 RHEUMATOID FACTOR QUANT: CPT

## 2023-10-25 PROCEDURE — 86140 C-REACTIVE PROTEIN: CPT

## 2023-10-25 PROCEDURE — 86038 ANTINUCLEAR ANTIBODIES: CPT

## 2023-10-25 PROCEDURE — 84466 ASSAY OF TRANSFERRIN: CPT

## 2023-10-25 PROCEDURE — 84550 ASSAY OF BLOOD/URIC ACID: CPT

## 2023-10-25 PROCEDURE — 80061 LIPID PANEL: CPT

## 2023-10-25 PROCEDURE — 85025 COMPLETE CBC W/AUTO DIFF WBC: CPT

## 2023-10-25 NOTE — PROGRESS NOTES
Chief Complaint  Chief Complaint   Patient presents with    Fatigue    Follow-up    Generalized Body Aches       Jose Schroeder presents to [facility]  The patient presents today for follow-up visit on hypertension and hyperlipidemia. She is experiencing chronic fatigue as well as myalgias. She does see pain management and they had recommended that she follow up with her PCP for some lab work to be done.    The patient has been busy working. Pain management asked her to talk to her PCP about muscle pain. Everything feels heavy and it is painful for her to move her body. She is drinking 5-hour energy drinks every 3 to 5 hours to keep her going. She was told by pain management that her current symptoms were lasting too long for it to be a flareup of fibromyalgia. She has been feeling run down for at least 3 months. She has not been sick. It feels like a flare-up to her with pain radiating everywhere. Pain management is holding her trigger point injections for now and she feels that is the only thing that helps her. She is going to work but at the weekends, she does not want to move. She intermittently gets a rash on the sides of her stomach. She was told in [place] that it was from standing in stagnate water. She asks if she may have mononucleosis. She has an appointment with pain management 11/02/2023. She wonders if she has arthritis or osteoporosis in her spine.     In 09/2023 it had been 21 years since she injured her back and has degenerative disc disease. She feels achy all over including her lower extremities, upper extremities, shoulders and neck. She was referred to a rheumatologist, but she did not go because it was during the pandemic. She had testing for fibromyalgia around that same time.     The patient did have shingles in her late 20s and early 30s. It was a small rash and pain that popped up on her head. She will occasionally get an itchy spot in  the same area, but it resolves in a day or 2.     The patient has lost weight, and her clothes are getting too big. She walks about 10 miles a day at work.     She sleeps fine but wakes sore and can barely move. It hurts to lie down. She works second shift and does not usually get to bed until 4:00 AM. She does not take a statin for cholesterol.     She has a little congestion occasionally. Her allergies are bad. She occasionally feels her heart skip a beat, but it does not last long. She has more heart racing if she forgets to take her propranolol which she takes for heart rate and blood pressure. She has seen a cardiologist in the past but not recently.     She is no longer seeing CONNOR Cooper. They wanted her to come in at 8:00 AM for her Botox injections and she could not get in there that early.     She has not followed up with OB/GYN for a Pap smear. Her IBS causes abdominal pain when she eats some things. She occasionally gets a menstrual-type cramp, but she has an IUD and does not have menstrual periods. She takes Motrin and it resolves. She felt nauseous last week for a couple of days.     We administered an influenza vaccine today.     Objective     Medical History:  Past Medical History:   Diagnosis Date    Abdominal pain     Allergic     Allergic rhinitis due to allergen 03/23/2020    Arthritis     Bladder problem     COPD (chronic obstructive pulmonary disease)     CTS (carpal tunnel syndrome) 2001    Just some pain, no surgery's    DDD (degenerative disc disease), lumbar     Depression with anxiety     Difficulty walking 09/2021    Do to my back injury    Dysmenorrhea 06/26/2020    Essential hypertension 03/23/2020    Essential hypertension, benign     Fibromyalgia, primary 2020    I was told I may have fibromyalgia    Headache, tension-type Late 90s    I've been getting them done my late teens    Hidradenitis suppurativa     Hidradenitis suppurativa of left axilla 03/23/2020    HPV (human  papilloma virus) infection     Hyperlipidemia     IBS (irritable bowel syndrome)     Impaired fasting glucose 2020    Ingrown toenail     Leg pain     Leg swelling     Low back pain     Lumbar herniated disc     Migraines     Neuropathy     Numbness in feet     OAB (overactive bladder) 2020    Pain management     PTSD (post-traumatic stress disorder)     Shingles 2007    Spinal stenosis     Thyroid nodule      Past Surgical History:   Procedure Laterality Date    BLADDER SURGERY      or     CHOLECYSTECTOMY  2019    KIDNEY SURGERY      or       Social History     Tobacco Use    Smoking status: Every Day     Packs/day: 1.00     Years: 26.00     Additional pack years: 0.00     Total pack years: 26.00     Types: Cigarettes     Start date: 1995     Last attempt to quit: 2021     Years since quittin.9    Smokeless tobacco: Never    Tobacco comments:     Ddd   Vaping Use    Vaping Use: Never used   Substance Use Topics    Alcohol use: Never     Comment: less than 1 drink per day, has been drinking less than one year    Drug use: Never     Family History   Problem Relation Age of Onset    Arthritis Mother     Heart disease Mother     Diabetes Mother     Depression Mother     Hyperlipidemia Mother     Hypertension Mother     Arthritis Father     Drug abuse Father     Hyperlipidemia Father     Hypertension Father     Diabetes Maternal Grandmother     Diabetes Paternal Grandmother         Unspecified       Medications:  Prior to Admission medications    Medication Sig Start Date End Date Taking? Authorizing Provider   amLODIPine (NORVASC) 5 MG tablet Take 1 tablet by mouth Daily. 23  Yes Berta Tucker APRN   cetirizine (zyrTEC) 10 MG tablet Take 1 tablet by mouth Daily. 10/11/23  Yes Berta Tucker APRN   fenofibrate (TRICOR) 145 MG tablet Take 1 tablet by mouth Daily. 10/11/23  Yes Berta Tucker APRN   fesoterodine fumarate (TOVIAZ ER) 8 MG tablet  "sustained-release 24 hour tablet Take 1 tablet by mouth Daily. 10/11/23  Yes Berta Tucker APRN   methocarbamol (ROBAXIN) 750 MG tablet methocarbamol 750 mg oral tablet take 1 tablet (750 mg) by oral route 3 times per day   Active   Yes Provider, MD Aurora   oxyCODONE-acetaminophen (PERCOCET) 7.5-325 MG per tablet Percocet 7.5-325 mg oral tablet take 1 tablet by oral route every 6 hours as needed   Active   Yes ProviderAurora MD   propranolol (INDERAL) 40 MG tablet Take 1 tablet by mouth 2 (Two) Times a Day. 10/11/23  Yes Berta Tucker APRN   Rimegepant Sulfate (Nurtec) 75 MG tablet dispersible tablet Take 1 tablet by mouth Daily As Needed (migraine). 2/17/22  Yes Pippa Wilson APRN   rizatriptan MLT (MAXALT-MLT) 10 MG disintegrating tablet Place 1 tablet on the tongue 1 (One) Time As Needed for Migraine for up to 1 dose. May repeat in 2 hours if needed 10/5/22  Yes Pippa Wilson APRN   Ventolin  (90 Base) MCG/ACT inhaler INHALE 2 PUFFS BY MOUTH FOUR TIMES DAILY 10/11/23  Yes Berta Tucker APRN   fluticasone (FLONASE) 50 MCG/ACT nasal spray SHAKE LIQUID AND USE 1 TO 2 SPRAYS IN EACH NOSTRIL EVERY DAY  Patient not taking: Reported on 10/25/2023 7/26/21   ProviderAurora MD   ibuprofen (ADVIL,MOTRIN) 800 MG tablet TAKE 1 TABLET BY MOUTH EVERY 8 HOURS AS NEEDED FOR MILD PAIN  Patient not taking: Reported on 10/25/2023 4/6/22   Sarah Bowers APRN        Allergies:   Penicillins    Health Maintenance Due   Topic Date Due    TDAP/TD VACCINES (1 - Tdap) Never done    PAP SMEAR  Never done    COVID-19 Vaccine (3 - 2023-24 season) 09/01/2023         Vital Signs:   /80   Pulse 85   Temp 98.2 °F (36.8 °C)   Ht 170.2 cm (67\")   Wt 111 kg (244 lb 3.2 oz)   SpO2 96%   BMI 38.25 kg/m²     Wt Readings from Last 3 Encounters:   10/25/23 111 kg (244 lb 3.2 oz)   05/09/23 118 kg (259 lb 12.8 oz)   10/17/22 124 kg (273 lb)     BP Readings from Last 3 " Encounters:   10/25/23 118/80   05/09/23 140/88   10/17/22 160/92       Physical Exam  Vitals reviewed.   Constitutional:       General: She is not in acute distress.     Appearance: Normal appearance. She is well-developed. She is obese.   HENT:      Head: Normocephalic and atraumatic.   Eyes:      Conjunctiva/sclera: Conjunctivae normal.      Pupils: Pupils are equal, round, and reactive to light.   Cardiovascular:      Rate and Rhythm: Normal rate and regular rhythm.      Heart sounds: No murmur heard.     No friction rub. No gallop.   Pulmonary:      Effort: Pulmonary effort is normal.      Breath sounds: Normal breath sounds. No wheezing or rhonchi.   Abdominal:      General: Bowel sounds are normal. There is no distension.      Palpations: Abdomen is soft.      Tenderness: There is no abdominal tenderness.   Skin:     General: Skin is warm and dry.      Findings: No rash.   Neurological:      Mental Status: She is alert and oriented to person, place, and time.      Cranial Nerves: No cranial nerve deficit.   Psychiatric:         Mood and Affect: Mood and affect normal.         Behavior: Behavior normal.         Thought Content: Thought content normal.         Judgment: Judgment normal.         Result Review :    The following data was reviewed by CONNOR Roman on 10/26/23 at 09:18 EDT:    Common labs          5/9/2023    16:48 10/25/2023    15:42   Common Labs   Glucose 88  127    BUN 19  18    Creatinine 0.82  0.90    Sodium 139  140    Potassium 4.2  3.8    Chloride 106  108    Calcium 10.0  9.4    Albumin 4.6  4.4    Total Bilirubin 0.3  0.2    Alkaline Phosphatase 71  79    AST (SGOT) 25  32    ALT (SGPT) 19  27    WBC 10.75  8.91    Hemoglobin 14.6  13.7    Hematocrit 42.2  40.0    Platelets 330  298    Total Cholesterol 143  140    Triglycerides 227  269    HDL Cholesterol 25  22    LDL Cholesterol  80  74    Hemoglobin A1C 5.50     Uric Acid  4.0        No Images in the past 120 days  found..               Assessment and Plan    Diagnoses and all orders for this visit:    1. Essential hypertension (Primary)  -     CBC & Differential  -     Comprehensive Metabolic Panel    2. Mixed hyperlipidemia  -     Lipid Panel    3. Fibromyalgia  -     CBC & Differential  -     Comprehensive Metabolic Panel  -     Iron Profile  -     Ferritin    4. Fatigue, unspecified type  -     CBC & Differential  -     Comprehensive Metabolic Panel  -     Iron Profile  -     Ferritin    5. Polyarthralgia  -     CBC & Differential  -     Comprehensive Metabolic Panel  -     Lipid Panel  -     Iron Profile  -     Ferritin  -     Uric acid  -     Rheumatoid Factor  -     C-reactive protein  -     HONEY    6. Myalgia  -     CBC & Differential  -     Comprehensive Metabolic Panel  -     Lipid Panel  -     Iron Profile  -     Ferritin  -     Uric acid  -     Rheumatoid Factor  -     C-reactive protein  -     HONEY    7. Need for influenza vaccination  -     Fluzone >6 Months ([id number])       Hypertension/hyperlipidemia  Blood pressure appropriate in office today.  Lipid panel will be ordered to monitor effectiveness of current medications.    Fibromyalgia/joint and muscle pain  Will order laboratory studies today. Will do a rheumatoid panel and an HONEY.   Advised to ask pain management about getting updated imaging studies.   Increasing pain can be related to excessive work demand and increasing physical activity while on the job or potentially related to change in treatment plan from pain management regarding medication change for trigger points.    Pap smear  Advised to schedule this with OB/GYN.        Smoking Cessation:    Debi Schroeder  reports that she has been smoking cigarettes. She started smoking about 28 years ago. She has a 26.00 pack-year smoking history. She has never used smokeless tobacco.. I have educated her on the risk of diseases from using tobacco products such as cancer, COPD, and heart disease.     I  advised her to quit and she is not willing to quit.    I spent 3  minutes counseling the patient.      Follow Up   Return in about 3 months (around 1/25/2024) for Next scheduled follow up.  Patient was given instructions and counseling regarding her condition or for health maintenance advice. Please see specific information pulled into the AVS if appropriate.     Please note that portions of this note were completed with a voice recognition program.  Transcribed from ambient dictation for CONNOR Roman by Christy Jones.  10/25/23   16:06 EDT    Patient or patient representative verbalized consent to the visit recording.  I have personally performed the services described in this document as transcribed by the above individual, and it is both accurate and complete.

## 2023-10-27 LAB — ANA SER QL: NEGATIVE

## 2023-11-11 DIAGNOSIS — N31.8 HYPERTONICITY OF BLADDER: ICD-10-CM

## 2023-11-13 RX ORDER — FESOTERODINE FUMARATE 8 MG/1
8 TABLET, FILM COATED, EXTENDED RELEASE ORAL DAILY
Qty: 30 TABLET | Refills: 2 | Status: SHIPPED | OUTPATIENT
Start: 2023-11-13

## 2024-01-31 DIAGNOSIS — E78.2 MIXED HYPERLIPIDEMIA: ICD-10-CM

## 2024-01-31 RX ORDER — FENOFIBRATE 145 MG/1
145 TABLET, COATED ORAL DAILY
Qty: 30 TABLET | Refills: 1 | Status: SHIPPED | OUTPATIENT
Start: 2024-01-31

## 2024-02-21 ENCOUNTER — TELEPHONE (OUTPATIENT)
Dept: FAMILY MEDICINE CLINIC | Facility: CLINIC | Age: 49
End: 2024-02-21

## 2024-02-21 NOTE — TELEPHONE ENCOUNTER
HUB TO READ - PLEASE RESCHEDULE PT APPT ON 02/22/2024 DUE TO PROVIDER BEING OUT OF OFFICE. THANK YOU

## 2024-02-24 DIAGNOSIS — E78.2 MIXED HYPERLIPIDEMIA: ICD-10-CM

## 2024-02-26 RX ORDER — FENOFIBRATE 145 MG/1
145 TABLET, COATED ORAL DAILY
Qty: 30 TABLET | Refills: 1 | Status: SHIPPED | OUTPATIENT
Start: 2024-02-26

## 2024-02-27 ENCOUNTER — OFFICE VISIT (OUTPATIENT)
Dept: FAMILY MEDICINE CLINIC | Facility: CLINIC | Age: 49
End: 2024-02-27
Payer: COMMERCIAL

## 2024-02-27 VITALS
HEIGHT: 67 IN | BODY MASS INDEX: 37.78 KG/M2 | WEIGHT: 240.7 LBS | SYSTOLIC BLOOD PRESSURE: 140 MMHG | RESPIRATION RATE: 18 BRPM | OXYGEN SATURATION: 98 % | DIASTOLIC BLOOD PRESSURE: 88 MMHG | HEART RATE: 79 BPM | TEMPERATURE: 97.9 F

## 2024-02-27 DIAGNOSIS — R35.0 URINARY FREQUENCY: ICD-10-CM

## 2024-02-27 DIAGNOSIS — R31.9 HEMATURIA, UNSPECIFIED TYPE: ICD-10-CM

## 2024-02-27 DIAGNOSIS — N32.81 OAB (OVERACTIVE BLADDER): ICD-10-CM

## 2024-02-27 DIAGNOSIS — I10 ESSENTIAL HYPERTENSION: Primary | ICD-10-CM

## 2024-02-27 DIAGNOSIS — R39.15 URINARY URGENCY: ICD-10-CM

## 2024-02-27 DIAGNOSIS — E78.2 MIXED HYPERLIPIDEMIA: ICD-10-CM

## 2024-02-27 LAB
BILIRUB BLD-MCNC: NEGATIVE MG/DL
BILIRUB UR QL STRIP: NEGATIVE
CLARITY UR: CLEAR
CLARITY, POC: ABNORMAL
COLOR UR: ABNORMAL
COLOR UR: YELLOW
GLUCOSE UR STRIP-MCNC: NEGATIVE MG/DL
GLUCOSE UR STRIP-MCNC: NEGATIVE MG/DL
HGB UR QL STRIP.AUTO: NEGATIVE
HOLD SPECIMEN: NORMAL
KETONES UR QL STRIP: NEGATIVE
KETONES UR QL: NEGATIVE
LEUKOCYTE EST, POC: NEGATIVE
LEUKOCYTE ESTERASE UR QL STRIP.AUTO: NEGATIVE
NITRITE UR QL STRIP: NEGATIVE
NITRITE UR-MCNC: NEGATIVE MG/ML
PH UR STRIP.AUTO: 6 [PH] (ref 5–8)
PH UR: 6 [PH] (ref 5–8)
PROT UR QL STRIP: NEGATIVE
PROT UR STRIP-MCNC: NEGATIVE MG/DL
RBC # UR STRIP: ABNORMAL /UL
SP GR UR STRIP: >=1.03 (ref 1–1.03)
SP GR UR: 1.03 (ref 1–1.03)
UROBILINOGEN UR QL STRIP: NORMAL
UROBILINOGEN UR QL: ABNORMAL

## 2024-02-27 PROCEDURE — 81002 URINALYSIS NONAUTO W/O SCOPE: CPT

## 2024-02-27 PROCEDURE — 99214 OFFICE O/P EST MOD 30 MIN: CPT

## 2024-02-27 PROCEDURE — 81003 URINALYSIS AUTO W/O SCOPE: CPT

## 2024-02-27 RX ORDER — ONABOTULINUMTOXINA 200 [USP'U]/1
INJECTION, POWDER, LYOPHILIZED, FOR SOLUTION INTRADERMAL; INTRAMUSCULAR
COMMUNITY
Start: 2024-01-23

## 2024-02-27 RX ORDER — OXYBUTYNIN CHLORIDE 15 MG/1
15 TABLET, EXTENDED RELEASE ORAL DAILY
Qty: 30 TABLET | Refills: 5 | Status: SHIPPED | OUTPATIENT
Start: 2024-02-27

## 2024-02-27 NOTE — PROGRESS NOTES
Chief Complaint  Chief Complaint   Patient presents with    Wellness Check     Follow up   Changing meds. Insurance doesn't cover          Subjective      Debi Schroeder presents to [facility]    Debi Schroeder is a 48-year-old female who presents for a 3-month follow up for hypertension and hyperlipidemia. An adult male accompanies her.    She has been doing well. She is still attending pain management. She has new insurance which no longer covers Toviaz. However, oxybutynin is covered under her new insurance. She has tried samples of Myrbetriq but She does not qualify for it. She has been taking the over the counter AZO which helps minimally. She has increased frequency and urgency. She denies dysuria.    She has difficulty setting an appointment for her Botox injections which she was receiving from neurology for treatment of migraines. She has not visited [facility] Cooper in over a year. She also needs prior authorization for her prescription of nurtec.    Her blood pressure is 140/88 mmHg today. In 05/2023, she was started on amlodipine 5 mg and advised to continue propranolol 40 mg twice a day. She has discontinued taking amlodipine 5 mg for unknown reasons. She no longer checks her blood pressure at home. She was prescribed Losartan from urgent care several years ago and did recently pick that up from the pharmacy but states that she is not currently taking that either.  She also had previously been prescribed hydrochlorothiazide in the past but is not currently taking that either.    Objective     Medical History:  Past Medical History:   Diagnosis Date    Abdominal pain     Allergic     Allergic rhinitis due to allergen 03/23/2020    Arthritis     Bladder problem     COPD (chronic obstructive pulmonary disease)     CTS (carpal tunnel syndrome) 2001    Just some pain, no surgery's    DDD (degenerative disc disease), lumbar     Depression with anxiety     Difficulty  walking 09/2021    Do to my back injury    Dysmenorrhea 06/26/2020    Essential hypertension 03/23/2020    Essential hypertension, benign     Fibromyalgia, primary 2020    I was told I may have fibromyalgia    Headache, tension-type Late 90s    I've been getting them done my late teens    Hidradenitis suppurativa     Hidradenitis suppurativa of left axilla 03/23/2020    HPV (human papilloma virus) infection     Hyperlipidemia     IBS (irritable bowel syndrome)     Impaired fasting glucose 03/23/2020    Ingrown toenail     Leg pain     Leg swelling     Low back pain 2001    Lumbar herniated disc     Migraines     Neuropathy     Neuropathy 10/07/2021    Numbness in feet     OAB (overactive bladder) 03/23/2020    Pain management     PTSD (post-traumatic stress disorder)     Shingles 2007    Spinal stenosis     Thyroid nodule      Past Surgical History:   Procedure Laterality Date    BLADDER SURGERY  1987    or 1988    CHOLECYSTECTOMY  2019    KIDNEY SURGERY  1987    or 1988      Social History     Tobacco Use    Smoking status: Every Day     Packs/day: 1.00     Years: 26.00     Additional pack years: 0.00     Total pack years: 26.00     Types: Cigarettes     Start date: 1/1/1995     Passive exposure: Current    Smokeless tobacco: Never    Tobacco comments:     Ddd   Vaping Use    Vaping Use: Never used   Substance Use Topics    Alcohol use: Never     Comment: less than 1 drink per day, has been drinking less than one year    Drug use: Never     Family History   Problem Relation Age of Onset    Arthritis Mother     Heart disease Mother     Diabetes Mother     Depression Mother     Hyperlipidemia Mother     Hypertension Mother     Arthritis Father     Drug abuse Father     Hyperlipidemia Father     Hypertension Father     Diabetes Maternal Grandmother     Diabetes Paternal Grandmother         Unspecified       Medications:  Prior to Admission medications    Medication Sig Start Date End Date Taking? Authorizing Provider    cetirizine (zyrTEC) 10 MG tablet Take 1 tablet by mouth Daily. 10/11/23  Yes Berta Tucker APRN   fenofibrate (TRICOR) 145 MG tablet TAKE 1 TABLET BY MOUTH DAILY 2/26/24  Yes Berta Tucker APRN   fesoterodine fumarate (Toviaz) 8 MG tablet sustained-release 24 hour tablet TAKE 1 TABLET BY MOUTH DAILY 11/13/23  Yes Berta Tucker APRN   methocarbamol (ROBAXIN) 750 MG tablet methocarbamol 750 mg oral tablet take 1 tablet (750 mg) by oral route 3 times per day   Active   Yes ProviderAurora MD   OnabotulinumtoxinA (Botox) 200 units reconstituted solution Take 200 units every 3 months by injection route. 1/23/24  Yes ProviderAurora MD   oxyCODONE-acetaminophen (PERCOCET) 7.5-325 MG per tablet Percocet 7.5-325 mg oral tablet take 1 tablet by oral route every 6 hours as needed   Active   Yes ProviderAurora MD   propranolol (INDERAL) 40 MG tablet Take 1 tablet by mouth 2 (Two) Times a Day. 10/11/23  Yes Berta Tucker APRN   Rimegepant Sulfate (Nurtec) 75 MG tablet dispersible tablet Take 1 tablet by mouth Daily As Needed (migraine). 2/17/22  Yes Pippa Wilson APRN   rizatriptan MLT (MAXALT-MLT) 10 MG disintegrating tablet Place 1 tablet on the tongue 1 (One) Time As Needed for Migraine for up to 1 dose. May repeat in 2 hours if needed 10/5/22  Yes Pippa Wilson APRN   Ventolin  (90 Base) MCG/ACT inhaler INHALE 2 PUFFS BY MOUTH FOUR TIMES DAILY 10/11/23  Yes Berta Tucker APRN   amLODIPine (NORVASC) 5 MG tablet Take 1 tablet by mouth Daily.  Patient not taking: Reported on 2/27/2024 5/9/23   Berta Tucker APRN        Allergies:   Penicillins and Adhesive tape    Health Maintenance Due   Topic Date Due    TDAP/TD VACCINES (1 - Tdap) Never done    PAP SMEAR  Never done    COVID-19 Vaccine (3 - 2023-24 season) 09/01/2023         Vital Signs:   /88 (BP Location: Right arm, Patient Position: Sitting, Cuff Size: Adult)   Pulse  "79   Temp 97.9 °F (36.6 °C) (Oral)   Resp 18   Ht 170.2 cm (67\")   Wt 109 kg (240 lb 11.2 oz)   SpO2 98%   BMI 37.70 kg/m²     Wt Readings from Last 3 Encounters:   02/27/24 109 kg (240 lb 11.2 oz)   10/25/23 111 kg (244 lb 3.2 oz)   05/09/23 118 kg (259 lb 12.8 oz)     BP Readings from Last 3 Encounters:   02/27/24 140/88   10/25/23 118/80   05/09/23 140/88     Physical Exam  Vitals reviewed.   Constitutional:       Appearance: Normal appearance. She is well-developed.   HENT:      Head: Normocephalic and atraumatic.   Eyes:      Conjunctiva/sclera: Conjunctivae normal.      Pupils: Pupils are equal, round, and reactive to light.   Cardiovascular:      Rate and Rhythm: Normal rate and regular rhythm.      Heart sounds: No murmur heard.     No friction rub. No gallop.   Pulmonary:      Effort: Pulmonary effort is normal.      Breath sounds: Normal breath sounds. No wheezing or rhonchi.   Abdominal:      General: Bowel sounds are normal. There is no distension.      Palpations: Abdomen is soft.      Tenderness: There is no abdominal tenderness.   Skin:     General: Skin is warm and dry.   Neurological:      Mental Status: She is alert and oriented to person, place, and time.      Cranial Nerves: No cranial nerve deficit.   Psychiatric:         Mood and Affect: Mood and affect normal.         Behavior: Behavior normal.         Thought Content: Thought content normal.         Judgment: Judgment normal.         Result Review :        Common labs          5/9/2023    16:48 10/25/2023    15:42   Common Labs   Glucose 88  127    BUN 19  18    Creatinine 0.82  0.90    Sodium 139  140    Potassium 4.2  3.8    Chloride 106  108    Calcium 10.0  9.4    Albumin 4.6  4.4    Total Bilirubin 0.3  0.2    Alkaline Phosphatase 71  79    AST (SGOT) 25  32    ALT (SGPT) 19  27    WBC 10.75  8.91    Hemoglobin 14.6  13.7    Hematocrit 42.2  40.0    Platelets 330  298    Total Cholesterol 143  140    Triglycerides 227  269    HDL " Cholesterol 25  22    LDL Cholesterol  80  74    Hemoglobin A1C 5.50     Uric Acid  4.0        No Images in the past 120 days found..               Assessment and Plan    Diagnoses and all orders for this visit:    1. Essential hypertension (Primary)  -     CBC & Differential; Future  -     Comprehensive Metabolic Panel; Future  -     Lipid Panel; Future    2. Mixed hyperlipidemia  -     CBC & Differential; Future  -     Comprehensive Metabolic Panel; Future  -     Lipid Panel; Future    3. Urinary urgency  -     POCT urinalysis dipstick, manual  -     Urinalysis With Culture If Indicated - Urine, Clean Catch    4. Urinary frequency  -     POCT urinalysis dipstick, manual  -     Urinalysis With Culture If Indicated - Urine, Clean Catch    5. OAB (overactive bladder)  -     oxybutynin XL (DITROPAN XL) 15 MG 24 hr tablet; Take 1 tablet by mouth Daily.  Dispense: 30 tablet; Refill: 5  -     Urinalysis With Culture If Indicated - Urine, Clean Catch    6. Hematuria, unspecified type  -     Urinalysis With Culture If Indicated - Urine, Clean Catch       1. Hypertension  - Continue propanolol. If her BP remains elevated, I am going to start her on a low dose of losartan.    2. Mixed hyperlipidemia  - Labs ordered.  Continue fenofibrate as ordered.    3. Urinary urgency/frequency  - Urinalysis and labs ordered.  Urinalysis shows a trace amount of blood, no leukocytes, but patient has been on Azo and may not be accurate results.  Urinalysis with culture if indicated to be sent off.  If patient does have UTI with positive leukocytes or bacteria, she will be treated with Macrobid for antibiotic as indicated.    4. Overactive bladder  -Prescribed oxybutynin.          Follow Up   No follow-ups on file.  Patient was given instructions and counseling regarding her condition or for health maintenance advice. Please see specific information pulled into the AVS if appropriate.     Please note that portions of this note were completed  with a voice recognition program.    CONNOR Roman    Transcribed from ambient dictation for CONNOR Roman by Marty Kim.  02/27/24   13:38 EST    Patient or patient representative verbalized consent to the visit recording.  I have personally performed the services described in this document as transcribed by the above individual, and it is both accurate and complete.

## 2024-02-28 ENCOUNTER — PATIENT MESSAGE (OUTPATIENT)
Dept: FAMILY MEDICINE CLINIC | Facility: CLINIC | Age: 49
End: 2024-02-28
Payer: COMMERCIAL

## 2024-02-28 DIAGNOSIS — I10 ESSENTIAL HYPERTENSION: ICD-10-CM

## 2024-02-28 RX ORDER — PROPRANOLOL HYDROCHLORIDE 40 MG/1
40 TABLET ORAL 2 TIMES DAILY
Qty: 180 TABLET | Refills: 0 | Status: SHIPPED | OUTPATIENT
Start: 2024-02-28

## 2024-02-28 NOTE — TELEPHONE ENCOUNTER
From: Debi Schroeder  To: Berta Tucker  Sent: 2/28/2024 6:48 AM EST  Subject: I need a refill    Hi, I forgot to tell you yesterday that I needed a new refill for my propranolol. I didn't have any refills left when I transferred all my meds to [facility]. I tried to just send a refill request but it didn't give me the option to have it sent to [facility]. Please make sure that it is send to [facility] in [place].   Thank you so much,   Debi Schroeder

## 2024-03-27 NOTE — ASSESSMENT & PLAN NOTE
Faxed clearance   Migraines are not yet at goal.  Discussed building effects of Botox. Continue botox for preventative therapy of migraine and Maxalt PRN for abortive therapy.     Botox will be administered per accepted algorithm for chronic migraine with a total of 155 units given divided as follows: 10 units in the , 5 units in the procerus, 20 units in the frontalis, 40 units in the temporalis, 30 units in the occipitalis, 20 units in the cervical paraspinals and 30 units in the trapezius.  This therapy will be given every 12 weeks.

## 2024-05-01 DIAGNOSIS — E78.2 MIXED HYPERLIPIDEMIA: ICD-10-CM

## 2024-05-01 RX ORDER — FENOFIBRATE 145 MG/1
145 TABLET, COATED ORAL DAILY
Qty: 30 TABLET | Refills: 1 | Status: SHIPPED | OUTPATIENT
Start: 2024-05-01

## 2024-05-30 DIAGNOSIS — I10 ESSENTIAL HYPERTENSION: ICD-10-CM

## 2024-05-30 RX ORDER — PROPRANOLOL HYDROCHLORIDE 40 MG/1
40 TABLET ORAL 2 TIMES DAILY
Qty: 180 TABLET | Refills: 0 | Status: SHIPPED | OUTPATIENT
Start: 2024-05-30

## 2024-06-25 DIAGNOSIS — E78.2 MIXED HYPERLIPIDEMIA: ICD-10-CM

## 2024-06-25 RX ORDER — FENOFIBRATE 145 MG/1
145 TABLET, COATED ORAL DAILY
Qty: 30 TABLET | Refills: 1 | Status: SHIPPED | OUTPATIENT
Start: 2024-06-25

## 2024-08-17 DIAGNOSIS — E78.2 MIXED HYPERLIPIDEMIA: ICD-10-CM

## 2024-08-19 RX ORDER — FENOFIBRATE 145 MG/1
145 TABLET, COATED ORAL DAILY
Qty: 30 TABLET | Refills: 1 | Status: SHIPPED | OUTPATIENT
Start: 2024-08-19

## 2024-08-20 DIAGNOSIS — N32.81 OAB (OVERACTIVE BLADDER): ICD-10-CM

## 2024-08-20 RX ORDER — OXYBUTYNIN CHLORIDE 15 MG/1
15 TABLET, EXTENDED RELEASE ORAL DAILY
Qty: 30 TABLET | Refills: 0 | Status: SHIPPED | OUTPATIENT
Start: 2024-08-20

## 2024-08-24 DIAGNOSIS — I10 ESSENTIAL HYPERTENSION: ICD-10-CM

## 2024-08-26 RX ORDER — PROPRANOLOL HYDROCHLORIDE 40 MG/1
40 TABLET ORAL 2 TIMES DAILY
Qty: 180 TABLET | Refills: 0 | Status: SHIPPED | OUTPATIENT
Start: 2024-08-26

## 2024-09-13 RX ORDER — ALBUTEROL SULFATE 90 UG/1
2 AEROSOL, METERED RESPIRATORY (INHALATION) 4 TIMES DAILY
Qty: 18 G | Refills: 3 | Status: SHIPPED | OUTPATIENT
Start: 2024-09-13

## 2024-09-17 DIAGNOSIS — N32.81 OAB (OVERACTIVE BLADDER): ICD-10-CM

## 2024-09-17 RX ORDER — OXYBUTYNIN CHLORIDE 15 MG/1
15 TABLET, EXTENDED RELEASE ORAL DAILY
Qty: 30 TABLET | Refills: 0 | Status: SHIPPED | OUTPATIENT
Start: 2024-09-17

## 2024-10-10 DIAGNOSIS — E78.2 MIXED HYPERLIPIDEMIA: ICD-10-CM

## 2024-10-10 RX ORDER — FENOFIBRATE 145 MG/1
145 TABLET, COATED ORAL DAILY
Qty: 30 TABLET | Refills: 1 | Status: SHIPPED | OUTPATIENT
Start: 2024-10-10

## 2024-10-15 DIAGNOSIS — N32.81 OAB (OVERACTIVE BLADDER): ICD-10-CM

## 2024-10-15 RX ORDER — OXYBUTYNIN CHLORIDE 15 MG/1
15 TABLET, EXTENDED RELEASE ORAL DAILY
Qty: 30 TABLET | Refills: 0 | Status: SHIPPED | OUTPATIENT
Start: 2024-10-15

## 2024-11-06 DIAGNOSIS — N32.81 OAB (OVERACTIVE BLADDER): ICD-10-CM

## 2024-11-06 RX ORDER — OXYBUTYNIN CHLORIDE 15 MG/1
15 TABLET, EXTENDED RELEASE ORAL DAILY
Qty: 30 TABLET | Refills: 0 | OUTPATIENT
Start: 2024-11-06

## 2024-11-25 DIAGNOSIS — I10 ESSENTIAL HYPERTENSION: ICD-10-CM

## 2024-11-25 RX ORDER — PROPRANOLOL HYDROCHLORIDE 40 MG/1
40 TABLET ORAL 2 TIMES DAILY
Qty: 180 TABLET | Refills: 0 | Status: SHIPPED | OUTPATIENT
Start: 2024-11-25

## 2024-12-04 ENCOUNTER — TELEPHONE (OUTPATIENT)
Dept: FAMILY MEDICINE CLINIC | Facility: CLINIC | Age: 49
End: 2024-12-04
Payer: COMMERCIAL

## 2024-12-04 ENCOUNTER — OFFICE VISIT (OUTPATIENT)
Dept: FAMILY MEDICINE CLINIC | Facility: CLINIC | Age: 49
End: 2024-12-04
Payer: COMMERCIAL

## 2024-12-04 VITALS
BODY MASS INDEX: 35 KG/M2 | SYSTOLIC BLOOD PRESSURE: 154 MMHG | HEIGHT: 67 IN | TEMPERATURE: 98.2 F | DIASTOLIC BLOOD PRESSURE: 94 MMHG | HEART RATE: 86 BPM | WEIGHT: 223 LBS | OXYGEN SATURATION: 97 %

## 2024-12-04 DIAGNOSIS — N32.81 OAB (OVERACTIVE BLADDER): ICD-10-CM

## 2024-12-04 DIAGNOSIS — E78.2 MIXED HYPERLIPIDEMIA: ICD-10-CM

## 2024-12-04 DIAGNOSIS — I10 HYPERTENSION, UNSPECIFIED TYPE: Primary | ICD-10-CM

## 2024-12-04 PROCEDURE — 99213 OFFICE O/P EST LOW 20 MIN: CPT | Performed by: FAMILY MEDICINE

## 2024-12-04 RX ORDER — FENOFIBRATE 145 MG/1
145 TABLET, COATED ORAL DAILY
Qty: 30 TABLET | Refills: 1 | Status: SHIPPED | OUTPATIENT
Start: 2024-12-04 | End: 2024-12-05 | Stop reason: SDUPTHER

## 2024-12-04 RX ORDER — OXYBUTYNIN CHLORIDE 15 MG/1
15 TABLET, EXTENDED RELEASE ORAL DAILY
Qty: 30 TABLET | Refills: 0 | Status: SHIPPED | OUTPATIENT
Start: 2024-12-04

## 2024-12-04 RX ORDER — FENOFIBRATE 145 MG/1
145 TABLET, COATED ORAL DAILY
Qty: 30 TABLET | Refills: 1 | Status: SHIPPED | OUTPATIENT
Start: 2024-12-04 | End: 2024-12-04

## 2024-12-04 NOTE — TELEPHONE ENCOUNTER
Pt saw Dr. Barrientos on 12/04/2024, and requested to switch providers as pt stated she felt more comfortable and had a better connection with Dr. Barrientos then with her primary care CONNOR Tucker. Informed pt that a message would be sent to the manager to review and both providers would need to be consulted prior to a Transfer of Care.

## 2024-12-05 DIAGNOSIS — E78.2 MIXED HYPERLIPIDEMIA: ICD-10-CM

## 2024-12-05 RX ORDER — FENOFIBRATE 145 MG/1
145 TABLET, COATED ORAL DAILY
Qty: 30 TABLET | Refills: 1 | Status: SHIPPED | OUTPATIENT
Start: 2024-12-05

## 2024-12-09 NOTE — PROGRESS NOTES
"Chief Complaint  Med Refill    Subjective      Debi NABOR Schroeder is a 49 y.o. female who presents to [facility]     History of Present Illness  The patient is a 49-year-old female who presents for a follow-up on medications needing refills. She is accompanied by an adult male.    She has been purchasing Zyrtec over the counter due to cost considerations.    She was previously using Botox for migraines but discontinued it due to insurance changes that increased her out-of-pocket expenses. She also stopped using Nurtec for the same reason. Currently, she is taking rizatriptan and propranolol for blood pressure.    For her bladder incontinence, she was on Myrbetriq, which she found effective, but had to switch to oxybutynin due to insurance coverage issues. She reports that oxybutynin is not as effective as Myrbetriq. She experiences frequent urination, needing to use the bathroom every 1.5 to 2 hours. She reports no symptoms of urinary tract infection or flank pain. She has a history of kidney stones, which occasionally cause discomfort.    She does not need a work note.    She is going in for her trigger point injection tomorrow at [facility].        Patient Care Team:  Berta Tucker APRN as PCP - General (Family Medicine)    Objective   Vital Signs:   Vitals:    12/04/24 1123   BP: 154/94   Pulse: 86   Temp: 98.2 °F (36.8 °C)   SpO2: 97%   Weight: 101 kg (223 lb)   Height: 170.2 cm (67\")     Body mass index is 34.93 kg/m².    Wt Readings from Last 3 Encounters:   12/04/24 101 kg (223 lb)   02/27/24 109 kg (240 lb 11.2 oz)   10/25/23 111 kg (244 lb 3.2 oz)     BP Readings from Last 3 Encounters:   12/04/24 154/94   02/27/24 140/88   10/25/23 118/80       Health Maintenance   Topic Date Due    TDAP/TD VACCINES (1 - Tdap) Never done    PAP SMEAR  Never done    ANNUAL PHYSICAL  05/09/2024    BMI FOLLOWUP  05/09/2024    COVID-19 Vaccine (3 - 2024-25 season) " 09/01/2024    LIPID PANEL  10/25/2024    MAMMOGRAM  10/28/2024    COLORECTAL CANCER SCREENING  05/17/2026    HEPATITIS C SCREENING  Completed    Pneumococcal Vaccine 0-64  Completed    INFLUENZA VACCINE  Completed       Lab Results (last 24 hours)       ** No results found for the last 24 hours. **               Physical Exam  Vitals and nursing note reviewed.   Constitutional:       General: She is not in acute distress.     Appearance: Normal appearance. She is not ill-appearing.   HENT:      Head: Normocephalic and atraumatic.   Eyes:      Extraocular Movements: Extraocular movements intact.      Conjunctiva/sclera: Conjunctivae normal.   Cardiovascular:      Rate and Rhythm: Normal rate.   Pulmonary:      Effort: Pulmonary effort is normal.   Skin:     General: Skin is warm and dry.   Neurological:      General: No focal deficit present.      Mental Status: She is alert and oriented to person, place, and time.   Psychiatric:         Mood and Affect: Mood normal.         Behavior: Behavior normal.          Physical Exam  Clear lung fields.  Regular heart sounds.      Result Review   The following data was reviewed by: Renetta Barrientos MD on 12/04/2024:  [x]  Tests & Results  []  Hospitalization/Emergency Department/Urgent Care  []  Internal/External Consultant Notes    Results        Procedures          ASSESSMENT/PLAN  Diagnoses and all orders for this visit:    1. OAB (overactive bladder)  -     oxybutynin XL (DITROPAN XL) 15 MG 24 hr tablet; Take 1 tablet by mouth Daily.  Dispense: 30 tablet; Refill: 0    2. Mixed hyperlipidemia  -     Discontinue: fenofibrate (TRICOR) 145 MG tablet; Take 1 tablet by mouth Daily.  Dispense: 30 tablet; Refill: 1  -     Discontinue: fenofibrate (TRICOR) 145 MG tablet; Take 1 tablet by mouth Daily.  Dispense: 30 tablet; Refill: 1        Assessment & Plan  1. Migraine.  She has discontinued Botox and Nurtec due to insurance changes and cost. She is currently using Rizatriptan  (Maxalt) which is affordable. Samples of Nurtec will be provided if available.     2. Overactive Bladder.  She was previously on Myrbetriq, which was effective but too expensive. She is currently on oxybutynin, which is not as effective but affordable. Discussed potential prescription of Myrbetriq but cost is prohibitive. She was advised to use [facility] for potential discounts in addition to insurance.    3. Elevated Blood Pressure.  Her blood pressure was elevated today. She was advised to avoid smoking for at least 30 minutes before her next visit to get an accurate reading.     4. Anxiety.  She reports severe anxiety, especially when visiting the doctor. No specific treatment plan was discussed during this visit.    5. Medication Management.  Refills for her medications, oxybutynin for overactive bladder and fenofibrate (TriCor) for cholesterol, will be provided. She is using over-the-counter Zyrtec.    Follow-up  Return in 5 weeks for follow up.            Debi Schroeder  reports that she has been smoking cigarettes. She started smoking about 29 years ago. She has a 29.9 pack-year smoking history. She has been exposed to tobacco smoke. She has never used smokeless tobacco. I have educated her on the risk of diseases from using tobacco products such as cancer, COPD, and heart disease and bladder irritation.    I advised her to quit and she is not willing to quit.    I spent 8 minutes counseling the patient.               FOLLOW UP  Return in about 5 weeks (around 1/8/2025).  Patient was given instructions and counseling regarding her condition or for health maintenance advice. Please see specific information pulled into the AVS if appropriate.       Renetta Barrientos MD  12/09/24  08:23 EST    Part of this note may be an electronic transcription/translation of spoken language to printed text using the Dragon Dictation System.    Patient or patient representative verbalized consent for the use of Ambient  Listening during the visit with  Renetta Barrientos MD for chart documentation. 12/9/2024  08:26 EST

## 2024-12-31 DIAGNOSIS — N32.81 OAB (OVERACTIVE BLADDER): ICD-10-CM

## 2025-01-02 RX ORDER — OXYBUTYNIN CHLORIDE 15 MG/1
15 TABLET, EXTENDED RELEASE ORAL DAILY
Qty: 30 TABLET | Refills: 2 | Status: SHIPPED | OUTPATIENT
Start: 2025-01-02

## 2025-02-03 DIAGNOSIS — E78.2 MIXED HYPERLIPIDEMIA: ICD-10-CM

## 2025-02-03 RX ORDER — FENOFIBRATE 145 MG/1
145 TABLET, COATED ORAL DAILY
Qty: 30 TABLET | Refills: 1 | Status: SHIPPED | OUTPATIENT
Start: 2025-02-03

## 2025-02-19 DIAGNOSIS — I10 ESSENTIAL HYPERTENSION: ICD-10-CM

## 2025-02-19 RX ORDER — PROPRANOLOL HYDROCHLORIDE 40 MG/1
40 TABLET ORAL 2 TIMES DAILY
Qty: 180 TABLET | Refills: 0 | Status: SHIPPED | OUTPATIENT
Start: 2025-02-19

## 2025-02-24 PROCEDURE — 82948 REAGENT STRIP/BLOOD GLUCOSE: CPT

## 2025-02-24 PROCEDURE — 87086 URINE CULTURE/COLONY COUNT: CPT | Performed by: NURSE PRACTITIONER

## 2025-02-25 ENCOUNTER — APPOINTMENT (OUTPATIENT)
Dept: GENERAL RADIOLOGY | Facility: HOSPITAL | Age: 50
End: 2025-02-25
Payer: COMMERCIAL

## 2025-02-25 LAB
ALBUMIN SERPL-MCNC: 4.6 G/DL (ref 3.5–5.2)
ALBUMIN/GLOB SERPL: 1.2 G/DL
ALP SERPL-CCNC: 88 U/L (ref 39–117)
ALT SERPL W P-5'-P-CCNC: 14 U/L (ref 1–33)
ANION GAP SERPL CALCULATED.3IONS-SCNC: 12.1 MMOL/L (ref 5–15)
AST SERPL-CCNC: 22 U/L (ref 1–32)
BASOPHILS # BLD AUTO: 0.02 10*3/MM3 (ref 0–0.2)
BASOPHILS NFR BLD AUTO: 0.2 % (ref 0–1.5)
BILIRUB SERPL-MCNC: 0.2 MG/DL (ref 0–1.2)
BILIRUB UR QL STRIP: NEGATIVE
BUN SERPL-MCNC: 25 MG/DL (ref 6–20)
BUN/CREAT SERPL: 31.3 (ref 7–25)
CALCIUM SPEC-SCNC: 9.9 MG/DL (ref 8.6–10.5)
CHLORIDE SERPL-SCNC: 102 MMOL/L (ref 98–107)
CLARITY UR: CLEAR
CO2 SERPL-SCNC: 22.9 MMOL/L (ref 22–29)
COLOR UR: YELLOW
CREAT SERPL-MCNC: 0.8 MG/DL (ref 0.57–1)
DEPRECATED RDW RBC AUTO: 49.7 FL (ref 37–54)
EGFRCR SERPLBLD CKD-EPI 2021: 91.6 ML/MIN/1.73
EOSINOPHIL # BLD AUTO: 0.03 10*3/MM3 (ref 0–0.4)
EOSINOPHIL NFR BLD AUTO: 0.3 % (ref 0.3–6.2)
ERYTHROCYTE [DISTWIDTH] IN BLOOD BY AUTOMATED COUNT: 14 % (ref 12.3–15.4)
GLOBULIN UR ELPH-MCNC: 4 GM/DL
GLUCOSE SERPL-MCNC: 144 MG/DL (ref 65–99)
GLUCOSE UR STRIP-MCNC: NEGATIVE MG/DL
HCT VFR BLD AUTO: 43 % (ref 34–46.6)
HGB BLD-MCNC: 14 G/DL (ref 12–15.9)
HGB UR QL STRIP.AUTO: NEGATIVE
HOLD SPECIMEN: NORMAL
HOLD SPECIMEN: NORMAL
IMM GRANULOCYTES # BLD AUTO: 0.03 10*3/MM3 (ref 0–0.05)
IMM GRANULOCYTES NFR BLD AUTO: 0.3 % (ref 0–0.5)
KETONES UR QL STRIP: NEGATIVE
LEUKOCYTE ESTERASE UR QL STRIP.AUTO: NEGATIVE
LYMPHOCYTES # BLD AUTO: 1.35 10*3/MM3 (ref 0.7–3.1)
LYMPHOCYTES NFR BLD AUTO: 15.4 % (ref 19.6–45.3)
MAGNESIUM SERPL-MCNC: 2.1 MG/DL (ref 1.6–2.6)
MCH RBC QN AUTO: 31.4 PG (ref 26.6–33)
MCHC RBC AUTO-ENTMCNC: 32.6 G/DL (ref 31.5–35.7)
MCV RBC AUTO: 96.4 FL (ref 79–97)
MONOCYTES # BLD AUTO: 0.23 10*3/MM3 (ref 0.1–0.9)
MONOCYTES NFR BLD AUTO: 2.6 % (ref 5–12)
NEUTROPHILS NFR BLD AUTO: 7.09 10*3/MM3 (ref 1.7–7)
NEUTROPHILS NFR BLD AUTO: 81.2 % (ref 42.7–76)
NITRITE UR QL STRIP: NEGATIVE
NRBC BLD AUTO-RTO: 0 /100 WBC (ref 0–0.2)
PH UR STRIP.AUTO: 6.5 [PH] (ref 5–8)
PLATELET # BLD AUTO: 332 10*3/MM3 (ref 140–450)
PMV BLD AUTO: 10.6 FL (ref 6–12)
POTASSIUM SERPL-SCNC: 4.4 MMOL/L (ref 3.5–5.2)
PROT SERPL-MCNC: 8.6 G/DL (ref 6–8.5)
PROT UR QL STRIP: ABNORMAL
QT INTERVAL: 357 MS
QTC INTERVAL: 402 MS
RBC # BLD AUTO: 4.46 10*6/MM3 (ref 3.77–5.28)
SODIUM SERPL-SCNC: 137 MMOL/L (ref 136–145)
SP GR UR STRIP: 1.02 (ref 1–1.03)
TROPONIN T SERPL HS-MCNC: <6 NG/L
UROBILINOGEN UR QL STRIP: ABNORMAL
WBC NRBC COR # BLD AUTO: 8.75 10*3/MM3 (ref 3.4–10.8)
WHOLE BLOOD HOLD COAG: NORMAL
WHOLE BLOOD HOLD SPECIMEN: NORMAL

## 2025-02-25 PROCEDURE — 81003 URINALYSIS AUTO W/O SCOPE: CPT

## 2025-02-25 PROCEDURE — 36415 COLL VENOUS BLD VENIPUNCTURE: CPT

## 2025-02-25 PROCEDURE — 85025 COMPLETE CBC W/AUTO DIFF WBC: CPT

## 2025-02-25 PROCEDURE — 83735 ASSAY OF MAGNESIUM: CPT

## 2025-02-25 PROCEDURE — 84484 ASSAY OF TROPONIN QUANT: CPT

## 2025-02-25 PROCEDURE — 99284 EMERGENCY DEPT VISIT MOD MDM: CPT

## 2025-02-25 PROCEDURE — 93005 ELECTROCARDIOGRAM TRACING: CPT | Performed by: EMERGENCY MEDICINE

## 2025-02-25 PROCEDURE — 93005 ELECTROCARDIOGRAM TRACING: CPT

## 2025-02-25 PROCEDURE — 71045 X-RAY EXAM CHEST 1 VIEW: CPT

## 2025-02-25 PROCEDURE — 80053 COMPREHEN METABOLIC PANEL: CPT

## 2025-02-25 RX ORDER — SODIUM CHLORIDE 0.9 % (FLUSH) 0.9 %
10 SYRINGE (ML) INJECTION AS NEEDED
Status: DISCONTINUED | OUTPATIENT
Start: 2025-02-25 | End: 2025-02-26 | Stop reason: HOSPADM

## 2025-02-26 ENCOUNTER — HOSPITAL ENCOUNTER (EMERGENCY)
Facility: HOSPITAL | Age: 50
Discharge: HOME | End: 2025-02-26
Attending: EMERGENCY MEDICINE | Admitting: EMERGENCY MEDICINE
Payer: COMMERCIAL

## 2025-02-26 VITALS
HEIGHT: 67 IN | WEIGHT: 217.59 LBS | OXYGEN SATURATION: 96 % | HEART RATE: 71 BPM | RESPIRATION RATE: 17 BRPM | BODY MASS INDEX: 34.15 KG/M2 | TEMPERATURE: 98.7 F | DIASTOLIC BLOOD PRESSURE: 89 MMHG | SYSTOLIC BLOOD PRESSURE: 150 MMHG

## 2025-02-26 DIAGNOSIS — J01.90 ACUTE SINUSITIS, RECURRENCE NOT SPECIFIED, UNSPECIFIED LOCATION: ICD-10-CM

## 2025-02-26 DIAGNOSIS — R42 VERTIGO: Primary | ICD-10-CM

## 2025-02-26 LAB
GEN 5 1HR TROPONIN T REFLEX: <6 NG/L
TROPONIN T NUMERIC DELTA: NORMAL

## 2025-02-26 PROCEDURE — 84484 ASSAY OF TROPONIN QUANT: CPT | Performed by: EMERGENCY MEDICINE

## 2025-02-26 RX ORDER — AZITHROMYCIN 250 MG/1
TABLET, FILM COATED ORAL
Qty: 6 TABLET | Refills: 0 | Status: SHIPPED | OUTPATIENT
Start: 2025-02-26

## 2025-02-26 RX ORDER — MECLIZINE HYDROCHLORIDE 25 MG/1
25 TABLET ORAL 3 TIMES DAILY PRN
Qty: 15 TABLET | Refills: 0 | Status: SHIPPED | OUTPATIENT
Start: 2025-02-26

## 2025-02-26 NOTE — Clinical Note
[facility] EMERGENCY ROOM  [address] [place] [address]  Phone: [phone]  Fax: [phone]    Debi Schroeder was seen and treated in our emergency department on 2/25/2025.  She may return to work on 03/03/2025.         Thank you for choosing [facility].    Renae Mesa, RN

## 2025-02-26 NOTE — ED PROVIDER NOTES
Time: 8:15 PM EST  Date of encounter:  2/25/2025  Independent Historian/Clinical History and Information was obtained by:   Patient    History is limited by: N/A    Chief Complaint   Patient presents with    Dizziness     Pt states she has been dizzy, N/V, and near snycopal episode on sunday.Pt states that she was dx with bronchitis last week and have been taking medications for it.         History of Present Illness:  Patient is a 49 y.o. year old female who presents to the emergency department for evaluation of dizziness since last week.  Patient states that she had multiple episodes of near syncope that occurs with nausea.  Patient was diagnosed with bronchitis last week and has been taking medications and was started on steroids yesterday.  [facility] with no abnormalities however urgent care referred her here for blood work.(Bailey Seaver, APRN, FNP-C)    Patient Care Team  Primary Care Provider: Berta Tucker APRN    Past Medical History:     Allergies   Allergen Reactions    Penicillins Anaphylaxis    Adhesive Tape Itching     Past Medical History:   Diagnosis Date    Abdominal pain     Allergic     Allergic rhinitis due to allergen 03/23/2020    Arthritis     Bladder problem     COPD (chronic obstructive pulmonary disease)     CTS (carpal tunnel syndrome) 2001    Just some pain, no surgery's    DDD (degenerative disc disease), lumbar     Depression with anxiety     Difficulty walking 09/2021    Do to my back injury    Dysmenorrhea 06/26/2020    Essential hypertension 03/23/2020    Essential hypertension, benign     Fibromyalgia, primary 2020    I was told I may have fibromyalgia    Headache, tension-type Late 90s    I've been getting them done my late teens    Hidradenitis suppurativa     Hidradenitis suppurativa of left axilla 03/23/2020    HPV (human papilloma virus) infection     Hyperlipidemia     IBS (irritable bowel syndrome)     Impaired fasting  glucose 03/23/2020    Ingrown toenail     Leg pain     Leg swelling     Low back pain 2001    Lumbar herniated disc     Migraines     Neuropathy     Neuropathy 10/07/2021    Numbness in feet     OAB (overactive bladder) 03/23/2020    Pain management     PTSD (post-traumatic stress disorder)     Shingles 2007    Spinal stenosis     Thyroid nodule      Past Surgical History:   Procedure Laterality Date    BLADDER SURGERY  1987    or 1988    CHOLECYSTECTOMY  2019    KIDNEY SURGERY  1987    or 1988     Family History   Problem Relation Age of Onset    Arthritis Mother     Heart disease Mother     Diabetes Mother     Depression Mother     Hyperlipidemia Mother     Hypertension Mother     Arthritis Father     Drug abuse Father     Hyperlipidemia Father     Hypertension Father     Diabetes Maternal Grandmother     Diabetes Paternal Grandmother         Unspecified       Home Medications:  Prior to Admission medications    Medication Sig Start Date End Date Taking? Authorizing Provider   albuterol sulfate  (90 Base) MCG/ACT inhaler INHALE 2 PUFFS BY MOUTH FOUR TIMES DAILY 9/13/24   Berta Tucker APRN   cetirizine (zyrTEC) 10 MG tablet Take 1 tablet by mouth Daily.  Patient taking differently: Take 1 tablet by mouth Daily. Using over the counter 10/11/23   Berta Tucker APRN   fenofibrate (TRICOR) 145 MG tablet TAKE 1 TABLET BY MOUTH EVERY DAY 2/3/25   Renetta Barrientos MD   guaiFENesin (Mucinex) 600 MG 12 hr tablet Every 12 (Twelve) Hours. 2/17/25 2/24/25  ProviderAurora MD   methocarbamol (ROBAXIN) 750 MG tablet methocarbamol 750 mg oral tablet take 1 tablet (750 mg) by oral route 3 times per day   Active    ProviderAurora MD   oxybutynin XL (DITROPAN XL) 15 MG 24 hr tablet TAKE 1 TABLET BY MOUTH EVERY DAY 1/2/25   Berta Tucker APRN   oxyCODONE-acetaminophen (PERCOCET) 7.5-325 MG per tablet Percocet 7.5-325 mg oral tablet take 1 tablet by oral route every 6 hours as  "needed   Active    Provider, MD Aurora   predniSONE (DELTASONE) 20 MG tablet Take 1 tablet by mouth Daily With Breakfast for 5 days. 2/24/25 3/1/25  Ernestine Nguyen APRN   propranolol (INDERAL) 40 MG tablet TAKE 1 TABLET BY MOUTH TWICE A DAY 2/19/25   Berta uTcker APRN        Social History:   Social History     Tobacco Use    Smoking status: Every Day     Current packs/day: 1.00     Average packs/day: 1 pack/day for 30.2 years (30.2 ttl pk-yrs)     Types: Cigarettes     Start date: 1/1/1995     Passive exposure: Current    Smokeless tobacco: Never    Tobacco comments:     Ddd   Vaping Use    Vaping status: Never Used   Substance Use Topics    Alcohol use: Never     Comment: less than 1 drink per day, has been drinking less than one year    Drug use: Never         Review of Systems:  Review of Systems   Neurological:  Positive for dizziness and light-headedness.        Physical Exam:  /77 (Patient Position: Lying)   Pulse 64   Temp 98.7 °F (37.1 °C) (Oral)   Resp 22   Ht 170.2 cm (67\")   Wt 98.7 kg (217 lb 9.5 oz)   LMP  (LMP Unknown) Comment: IUD  SpO2 97%   BMI 34.08 kg/m²         Physical Exam  HENT:      Head: Normocephalic.      Mouth/Throat:      Mouth: Mucous membranes are moist.   Eyes:      Pupils: Pupils are equal, round, and reactive to light.   Pulmonary:      Effort: Pulmonary effort is normal.   Abdominal:      General: There is no distension.   Musculoskeletal:      Cervical back: Neck supple.   Skin:     General: Skin is warm and dry.   Neurological:      General: No focal deficit present.      Mental Status: She is alert and oriented to person, place, and time.   Psychiatric:         Mood and Affect: Mood normal.         Behavior: Behavior normal.                          Medical Decision Making:      Comorbidities that affect care:    fibromyalgia, COPD, Hypertension    External Notes reviewed:    Previous Clinic Note: Patient seen by her PCP on 12//24 for " hypertension, overactive bladder and hyperlipidemia      The following orders were placed and all results were independently analyzed by me:  Orders Placed This Encounter   Procedures    XR Chest 1 View    [place] Draw    Comprehensive Metabolic Panel    High Sensitivity Troponin T    Magnesium    Urinalysis With Microscopic If Indicated (No Culture) - Urine, Clean Catch    CBC Auto Differential    High Sensitivity Troponin T 1Hr    NPO Diet NPO Type: Strict NPO    Undress & Gown    Continuous Pulse Oximetry    Vital Signs    Orthostatic Blood Pressure    Oxygen Therapy- Nasal Cannula; Titrate 1-6 LPM Per SpO2; 90 - 95%    POC Glucose Once    ECG 12 Lead ED Triage Standing Order; Weak / Dizzy / AMS    Insert Peripheral IV    Fall Precautions    CBC & Differential    Green Top (Gel)    Lavender Top    Gold Top - SST    Light Blue Top       Medications Given in the Emergency Department:  Medications   sodium chloride 0.9 % flush 10 mL (has no administration in time range)        ED Course:    The patient was initially evaluated in the triage area where orders were placed. The patient was later dispositioned by Isabel Pimentel MD.      The patient was advised to stay for completion of workup which includes but is not limited to communication of labs and radiological results, reassessment and plan. The patient was advised that leaving prior to disposition by a provider could result in critical findings that are not communicated to the patient.     ED Course as of 02/26/25 0250   Tue Feb 25, 2025 2016 PROVIDER IN TRIAGE  Patient was evaluated by me in triage, Bailey Seaver, APRN, MARGARITAP-C.  Orders were placed and patient is currently awaiting final results and disposition.   [AS]   Wed Feb 26, 2025   0249 ECG 12 Lead ED Triage Standing Order; Weak / Dizzy / AMS  Sinus rhythm rate of 76.  No acute ST elevation.  Normal IL and QTc.  EKG interpreted by me [LD]      ED Course User Index  [AS] Seaver, Alyce B, APRN  [LD]  Isabel Pimentel MD       Labs:    Lab Results (last 24 hours)       Procedure Component Value Units Date/Time    CBC & Differential [[id number]]  (Abnormal) Collected: 02/25/25 2022    Specimen: Blood from Arm, Right Updated: 02/25/25 2031    Narrative:      The following orders were created for panel order CBC & Differential.  Procedure                               Abnormality         Status                     ---------                               -----------         ------                     CBC Auto Differential[[id number]]        Abnormal            Final result                 Please view results for these tests on the individual orders.    Comprehensive Metabolic Panel [[id number]]  (Abnormal) Collected: 02/25/25 2022    Specimen: Blood from Arm, Right Updated: 02/25/25 2059     Glucose 144 mg/dL      BUN 25 mg/dL      Creatinine 0.80 mg/dL      Sodium 137 mmol/L      Potassium 4.4 mmol/L      Chloride 102 mmol/L      CO2 22.9 mmol/L      Calcium 9.9 mg/dL      Total Protein 8.6 g/dL      Albumin 4.6 g/dL      ALT (SGPT) 14 U/L      AST (SGOT) 22 U/L      Alkaline Phosphatase 88 U/L      Total Bilirubin 0.2 mg/dL      Globulin 4.0 gm/dL      A/G Ratio 1.2 g/dL      BUN/Creatinine Ratio 31.3     Anion Gap 12.1 mmol/L      eGFR 91.6 mL/min/1.73     Narrative:      GFR Categories in Chronic Kidney Disease (CKD)      GFR Category          GFR (mL/min/1.73)    Interpretation  G1                     90 or greater         Normal or high (1)  G2                      60-89                Mild decrease (1)  G3a                   45-59                Mild to moderate decrease  G3b                   30-44                Moderate to severe decrease  G4                    15-29                Severe decrease  G5                    14 or less           Kidney failure          (1)In the absence of evidence of kidney disease, neither GFR category G1 or G2 fulfill the criteria for CKD.    eGFR calculation 2021 CKD-EPI  creatinine equation, which does not include race as a factor    High Sensitivity Troponin T [[id number]]  (Normal) Collected: 02/25/25 2022    Specimen: Blood from Arm, Right Updated: 02/25/25 2059     HS Troponin T <6 ng/L     Narrative:      High Sensitive Troponin T Reference Range:  <14.0 ng/L- Negative Female for AMI  <22.0 ng/L- Negative Male for AMI  >=14 - Abnormal Female indicating possible myocardial injury.  >=22 - Abnormal Male indicating possible myocardial injury.   Clinicians would have to utilize clinical acumen, EKG, Troponin, and serial changes to determine if it is an Acute Myocardial Infarction or myocardial injury due to an underlying chronic condition.         Magnesium [[id number]]  (Normal) Collected: 02/25/25 2022    Specimen: Blood from Arm, Right Updated: 02/25/25 2059     Magnesium 2.1 mg/dL     CBC Auto Differential [[id number]]  (Abnormal) Collected: 02/25/25 2022    Specimen: Blood from Arm, Right Updated: 02/25/25 2031     WBC 8.75 10*3/mm3      RBC 4.46 10*6/mm3      Hemoglobin 14.0 g/dL      Hematocrit 43.0 %      MCV 96.4 fL      MCH 31.4 pg      MCHC 32.6 g/dL      RDW 14.0 %      RDW-SD 49.7 fl      MPV 10.6 fL      Platelets 332 10*3/mm3      Neutrophil % 81.2 %      Lymphocyte % 15.4 %      Monocyte % 2.6 %      Eosinophil % 0.3 %      Basophil % 0.2 %      Immature Grans % 0.3 %      Neutrophils, Absolute 7.09 10*3/mm3      Lymphocytes, Absolute 1.35 10*3/mm3      Monocytes, Absolute 0.23 10*3/mm3      Eosinophils, Absolute 0.03 10*3/mm3      Basophils, Absolute 0.02 10*3/mm3      Immature Grans, Absolute 0.03 10*3/mm3      nRBC 0.0 /100 WBC     Urinalysis With Microscopic If Indicated (No Culture) - Urine, Clean Catch [[id number]]  (Abnormal) Collected: 02/25/25 2035    Specimen: Urine, Clean Catch Updated: 02/25/25 2045     Color, UA Yellow     Appearance, UA Clear     pH, UA 6.5     Specific Gravity, UA 1.020     Glucose, UA Negative     Ketones, UA Negative     Bilirubin, UA  Negative     Blood, UA Negative     Protein, UA Trace     Leuk Esterase, UA Negative     Nitrite, UA Negative     Urobilinogen, UA 1.0 E.U./dL    Narrative:      Urine microscopic not indicated.    High Sensitivity Troponin T 1Hr [[id number]] Collected: 02/26/25 0132    Specimen: Blood Updated: 02/26/25 0200     HS Troponin T <6 ng/L      Troponin T Numeric Delta --     Comment: Unable to calculate.       Narrative:      High Sensitive Troponin T Reference Range:  <14.0 ng/L- Negative Female for AMI  <22.0 ng/L- Negative Male for AMI  >=14 - Abnormal Female indicating possible myocardial injury.  >=22 - Abnormal Male indicating possible myocardial injury.   Clinicians would have to utilize clinical acumen, EKG, Troponin, and serial changes to determine if it is an Acute Myocardial Infarction or myocardial injury due to an underlying chronic condition.                  Imaging:    XR Chest 1 View    Result Date: 2/25/2025  XR CHEST 1 VW Date of Exam: 2/25/2025 8:53 PM EST Indication: Weak/Dizzy/AMS triage protocol Comparison: CXR 2/24/2025 Findings: The heart is normal in size. The lungs are well-expanded and free of infiltrates. Bony structures appear intact.     Impression: No active disease is seen. Electronically Signed: Jose Eduardo Sher MD  2/25/2025 9:20 PM EST  Workstation ID: [id number]       Differential Diagnosis and Discussion:      Dizziness: Based on the patient's history, signs, and symptoms, the diffential diagnosis includes but is not limited to meningitis, stroke, sepsis, subarachnoid hemorrhage, intracranial bleeding, encephalitis, vertigo, electrolyte imbalance, and metabolic disorders.    PROCEDURES:    Labs were collected in the emergency department and all labs were reviewed and interpreted by me.  X-ray were performed in the emergency department and all X-ray impressions were independently interpreted by me.  An EKG was performed and the EKG was interpreted by me.    ECG 12 Lead ED Triage Standing  Order; Weak / Dizzy / AMS   Preliminary Result   HEART RATE=76  bpm   RR Omhvawze=581  ms   KS Teraanpo=653  ms   P Horizontal Axis=4  deg   P Front Axis=24  deg   QRSD Interval=95  ms   QT Ffwbfbyh=313  ms   AKoW=258  ms   QRS Axis=4  deg   T Wave Axis=0  deg   - BORDERLINE ECG -   Sinus rhythm   Consider anterior infarct   Date and Time of Study:2025-02-25 20:45:05           Procedures    MDM  Number of Diagnoses or Management Options  Diagnosis management comments: Patient is afebrile nontoxic-appearing.  Patient reports she had a respiratory infection over a week ago.  She says last week she started having episodes of dizziness.  She described a spinning sensation.  These episodes are intermittent.  She has associated nausea and vomiting with it.  She does report some sinus pressure.  Description of episodes sounds like vertigo.  Since she has had sinus pressure and respiratory symptoms for over a week will treat for possible sinus infection.  Also give prescription for meclizine.  Recommend that she follows up closely with her primary care provider.  Discussed return precautions, discharge instructions and answered all questions.       Amount and/or Complexity of Data Reviewed  Clinical lab tests: reviewed  Tests in the radiology section of CPT®: reviewed  Review and summarize past medical records: yes  Independent visualization of images, tracings, or specimens: yes    Risk of Complications, Morbidity, and/or Mortality  Presenting problems: moderate  Management options: moderate                     Patient Care Considerations:    SEPSIS was considered but is NOT present in the emergency department as SIRS criteria is not present.      Consultants/Shared Management Plan:    None    Social Determinants of Health:    Patient is independent, reliable, and has access to care.       Disposition and Care Coordination:    Discharged: The patient is suitable and stable for discharge with no need for consideration of  admission.    I have explained the patient´s condition, diagnoses and treatment plan based on the information available to me at this time. I have answered questions and addressed any concerns. The patient has a good  understanding of the patient´s diagnosis, condition, and treatment plan as can be expected at this point. The vital signs have been stable. The patient´s condition is stable and appropriate for discharge from the emergency department.      The patient will pursue further outpatient evaluation with the primary care physician or other designated or consulting physician as outlined in the discharge instructions. They are agreeable to this plan of care and follow-up instructions have been explained in detail. The patient has received these instructions in written format and has expressed an understanding of the discharge instructions. The patient is aware that any significant change in condition or worsening of symptoms should prompt an immediate return to this or the closest emergency department or call to 911.  I have explained discharge medications and the need for follow up with the patient/caretakers. This was also printed in the discharge instructions. Patient was discharged with the following medications and follow up:      Medication List        New Prescriptions      azithromycin 250 MG tablet  Commonly known as: Zithromax Z-Allen  Take 2 tablets by mouth on day 1, then 1 tablet daily on days 2-5     meclizine 25 MG tablet  Commonly known as: ANTIVERT  Take 1 tablet by mouth 3 (Three) Times a Day As Needed for Dizziness.            Changed      cetirizine 10 MG tablet  Commonly known as: zyrTEC  Take 1 tablet by mouth Daily.  What changed: additional instructions            Stop      Mucinex 600 MG 12 hr tablet  Generic drug: guaiFENesin               Where to Get Your Medications        These medications were sent to [facility]/pharmacy #[id number] - [place], [place] - [address] - [phone]  -  [phone] FX  [address] [place] [address]      Hours: 24-hours Phone: [phone]   azithromycin 250 MG tablet  meclizine 25 MG tablet      Berta Tucker, APRN  [address] [id number] [phone]    In 2 days         Final diagnoses:   Vertigo   Acute sinusitis, recurrence not specified, unspecified location        ED Disposition       ED Disposition   Discharge    Condition   Stable    Comment   --               This medical record created using voice recognition software.             Isabel Pimentel MD  02/26/25 0250

## 2025-03-04 ENCOUNTER — OFFICE VISIT (OUTPATIENT)
Dept: FAMILY MEDICINE CLINIC | Facility: CLINIC | Age: 50
End: 2025-03-04
Payer: COMMERCIAL

## 2025-03-04 VITALS
OXYGEN SATURATION: 96 % | HEIGHT: 67 IN | SYSTOLIC BLOOD PRESSURE: 122 MMHG | HEART RATE: 80 BPM | WEIGHT: 218.4 LBS | TEMPERATURE: 97.8 F | DIASTOLIC BLOOD PRESSURE: 82 MMHG | BODY MASS INDEX: 34.28 KG/M2

## 2025-03-04 DIAGNOSIS — R73.09 ELEVATED GLUCOSE: ICD-10-CM

## 2025-03-04 DIAGNOSIS — M79.7 FIBROMYALGIA: ICD-10-CM

## 2025-03-04 DIAGNOSIS — E78.2 MIXED HYPERLIPIDEMIA: ICD-10-CM

## 2025-03-04 DIAGNOSIS — N32.81 OAB (OVERACTIVE BLADDER): ICD-10-CM

## 2025-03-04 DIAGNOSIS — I10 ESSENTIAL HYPERTENSION: ICD-10-CM

## 2025-03-04 DIAGNOSIS — Z00.00 ANNUAL PHYSICAL EXAM: Primary | ICD-10-CM

## 2025-03-04 DIAGNOSIS — N31.8 HYPERTONICITY OF BLADDER: ICD-10-CM

## 2025-03-04 DIAGNOSIS — Z97.5 IUD (INTRAUTERINE DEVICE) IN PLACE: ICD-10-CM

## 2025-03-04 DIAGNOSIS — Z71.6 ENCOUNTER FOR SMOKING CESSATION COUNSELING: ICD-10-CM

## 2025-03-04 DIAGNOSIS — R63.4 WEIGHT LOSS: ICD-10-CM

## 2025-03-04 LAB
ALBUMIN SERPL-MCNC: 4.1 G/DL (ref 3.5–5.2)
ALBUMIN/GLOB SERPL: 1.2 G/DL
ALP SERPL-CCNC: 83 U/L (ref 39–117)
ALT SERPL W P-5'-P-CCNC: 24 U/L (ref 1–33)
ANION GAP SERPL CALCULATED.3IONS-SCNC: 11.9 MMOL/L (ref 5–15)
AST SERPL-CCNC: 39 U/L (ref 1–32)
BASOPHILS # BLD AUTO: 0.06 10*3/MM3 (ref 0–0.2)
BASOPHILS NFR BLD AUTO: 0.5 % (ref 0–1.5)
BILIRUB SERPL-MCNC: 0.2 MG/DL (ref 0–1.2)
BUN SERPL-MCNC: 21 MG/DL (ref 6–20)
BUN/CREAT SERPL: 25.3 (ref 7–25)
CALCIUM SPEC-SCNC: 9.7 MG/DL (ref 8.6–10.5)
CHLORIDE SERPL-SCNC: 107 MMOL/L (ref 98–107)
CHOLEST SERPL-MCNC: 129 MG/DL (ref 0–200)
CO2 SERPL-SCNC: 23.1 MMOL/L (ref 22–29)
CREAT SERPL-MCNC: 0.83 MG/DL (ref 0.57–1)
DEPRECATED RDW RBC AUTO: 44.4 FL (ref 37–54)
EGFRCR SERPLBLD CKD-EPI 2021: 87.6 ML/MIN/1.73
EOSINOPHIL # BLD AUTO: 0.24 10*3/MM3 (ref 0–0.4)
EOSINOPHIL NFR BLD AUTO: 2 % (ref 0.3–6.2)
ERYTHROCYTE [DISTWIDTH] IN BLOOD BY AUTOMATED COUNT: 13 % (ref 12.3–15.4)
GLOBULIN UR ELPH-MCNC: 3.3 GM/DL
GLUCOSE SERPL-MCNC: 101 MG/DL (ref 65–99)
HBA1C MFR BLD: 5.4 % (ref 4.8–5.6)
HCT VFR BLD AUTO: 39.7 % (ref 34–46.6)
HDLC SERPL-MCNC: 33 MG/DL (ref 40–60)
HGB BLD-MCNC: 13.8 G/DL (ref 12–15.9)
IMM GRANULOCYTES # BLD AUTO: 0.07 10*3/MM3 (ref 0–0.05)
IMM GRANULOCYTES NFR BLD AUTO: 0.6 % (ref 0–0.5)
LDLC SERPL CALC-MCNC: 71 MG/DL (ref 0–100)
LDLC/HDLC SERPL: 2.06 {RATIO}
LYMPHOCYTES # BLD AUTO: 2.77 10*3/MM3 (ref 0.7–3.1)
LYMPHOCYTES NFR BLD AUTO: 23.4 % (ref 19.6–45.3)
MCH RBC QN AUTO: 32.7 PG (ref 26.6–33)
MCHC RBC AUTO-ENTMCNC: 34.8 G/DL (ref 31.5–35.7)
MCV RBC AUTO: 94.1 FL (ref 79–97)
MONOCYTES # BLD AUTO: 0.97 10*3/MM3 (ref 0.1–0.9)
MONOCYTES NFR BLD AUTO: 8.2 % (ref 5–12)
NEUTROPHILS NFR BLD AUTO: 65.3 % (ref 42.7–76)
NEUTROPHILS NFR BLD AUTO: 7.75 10*3/MM3 (ref 1.7–7)
NRBC BLD AUTO-RTO: 0 /100 WBC (ref 0–0.2)
PLATELET # BLD AUTO: 325 10*3/MM3 (ref 140–450)
PMV BLD AUTO: 11.9 FL (ref 6–12)
POTASSIUM SERPL-SCNC: 4.5 MMOL/L (ref 3.5–5.2)
PROT SERPL-MCNC: 7.4 G/DL (ref 6–8.5)
RBC # BLD AUTO: 4.22 10*6/MM3 (ref 3.77–5.28)
SODIUM SERPL-SCNC: 142 MMOL/L (ref 136–145)
TRIGL SERPL-MCNC: 140 MG/DL (ref 0–150)
TSH SERPL DL<=0.05 MIU/L-ACNC: 1.81 UIU/ML (ref 0.27–4.2)
VLDLC SERPL-MCNC: 25 MG/DL (ref 5–40)
WBC NRBC COR # BLD AUTO: 11.86 10*3/MM3 (ref 3.4–10.8)

## 2025-03-04 PROCEDURE — 83036 HEMOGLOBIN GLYCOSYLATED A1C: CPT

## 2025-03-04 PROCEDURE — 80050 GENERAL HEALTH PANEL: CPT

## 2025-03-04 PROCEDURE — 80061 LIPID PANEL: CPT

## 2025-03-04 RX ORDER — OXYCODONE AND ACETAMINOPHEN 10; 325 MG/1; MG/1
1 TABLET ORAL EVERY 8 HOURS PRN
COMMUNITY

## 2025-03-04 RX ORDER — RIZATRIPTAN BENZOATE 10 MG/1
10 TABLET, ORALLY DISINTEGRATING ORAL ONCE AS NEEDED
COMMUNITY
Start: 2025-02-24

## 2025-03-04 RX ORDER — VARENICLINE TARTRATE 0.5 (11)-1
KIT ORAL
Qty: 42 EACH | Refills: 0 | Status: SHIPPED | OUTPATIENT
Start: 2025-03-04 | End: 2025-03-31

## 2025-03-04 RX ORDER — VARENICLINE TARTRATE 0.5 (11)-1
KIT ORAL
Status: CANCELLED | OUTPATIENT
Start: 2025-03-04

## 2025-03-04 NOTE — PROGRESS NOTES
Chief Complaint  Chief Complaint   Patient presents with    Annual Exam    Dizziness     Pt seen at ER on 02/26/2025 for vertigo and sinusitis. Pt states she is feeling much better.    Sinusitis     Pt seen at ER on 02/26/2025 for vertigo and sinusitis. Pt states she is feeling much better.       Jose Schroeder presents to [facility]  History of Present Illness  The patient presents for evaluation of sinus infection, weight loss, smoking cessation, and health maintenance.    She sought medical attention at an urgent care facility a few weeks ago due to bronchitis. A few days after the diagnosis, she began experiencing dizziness, which was later attributed to a sinus infection causing vertigo. This was a new symptom for her, as she had not experienced it before. She had been generally healthy, with the exception of a cold in the past 6 years. She has a history of severe bronchitis, which often required hospitalization due to intense coughing episodes that induced vomiting. Since receiving annual influenza vaccinations, she has noticed a decrease in her illness frequency. She had to take a 2-week leave from work due to her symptoms. She visited a [facility] on 02/16/2025 after nearly fainting in the shower due to dizziness, where she was diagnosed with bronchitis. COVID-19 and influenza tests were negative. Two days later, she sought care at an urgent care facility due to persistent dizziness. The urgent care staff suggested a blood infection as the cause of her dizziness. She then went to the hospital, where blood work and x-rays were performed, leading to a diagnosis of sinus infection-induced vertigo. She returned to work on 03/03/2025 and reports feeling better, although slightly fatigued. She continues to cough up a small amount of mucus from her lungs but does not experience significant gurgling. She was prescribed an antibiotic and a steroid pack for her  sinus infection and used over-the-counter Mucinex for chest congestion. She also used an albuterol inhaler as needed during her illness. She works at [employer], where she walks approximately 10 miles per day. She spends about 7 hours daily in a refrigerated environment at work.    She has lost nearly 70 pounds, dropping from a size 20 to a size 12, without intentional dieting or exercise changes.    She smokes about a pack of cigarettes per day and has attempted to quit using patches on several occasions, but these attempts were unsuccessful. She has considered trying Chantix, as her brother found it helpful in quitting smoking. She estimates that she spends between $ 150 and $ 200 on cigarettes every two weeks.    She has had a Mirena intrauterine device (IUD) in place for nearly 4 years without any complications. She has a history of HPV and precancerous cervical cells, diagnosed approximately 12 years ago. She underwent several biopsies until the abnormal cells were no longer detected. Her last Pap smear was normal. She experienced menstrual cramps around the time of her period, but these have since resolved. Prior to the IUD placement, she had up to 3 periods per month and significant pain.    SOCIAL HISTORY  The patient smokes about a pack a day.      Objective     Medical History:  Past Medical History:   Diagnosis Date    Abdominal pain     Allergic     Allergic rhinitis due to allergen 03/23/2020    Arthritis     Bladder problem     COPD (chronic obstructive pulmonary disease)     CTS (carpal tunnel syndrome) 2001    Just some pain, no surgery's    DDD (degenerative disc disease), lumbar     Depression with anxiety     Difficulty walking 09/2021    Do to my back injury    Dysmenorrhea 06/26/2020    Essential hypertension 03/23/2020    Essential hypertension, benign     Fibromyalgia, primary 2020    I was told I may have fibromyalgia    Headache, tension-type Late 90s    I've been getting them done my late  teens    Hidradenitis suppurativa     Hidradenitis suppurativa of left axilla 03/23/2020    HPV (human papilloma virus) infection     Hyperlipidemia     IBS (irritable bowel syndrome)     Impaired fasting glucose 03/23/2020    Ingrown toenail     Leg pain     Leg swelling     Low back pain 2001    Lumbar herniated disc     Migraines     Neuropathy     Neuropathy 10/07/2021    Numbness in feet     OAB (overactive bladder) 03/23/2020    Pain management     PTSD (post-traumatic stress disorder)     Shingles 2007    Spinal stenosis     Thyroid nodule      Past Surgical History:   Procedure Laterality Date    BLADDER SURGERY  1987    or 1988    CHOLECYSTECTOMY  2019    KIDNEY SURGERY  1987    or 1988      Social History     Tobacco Use    Smoking status: Every Day     Current packs/day: 1.00     Average packs/day: 1 pack/day for 30.2 years (30.2 ttl pk-yrs)     Types: Cigarettes     Start date: 1/1/1995     Passive exposure: Current    Smokeless tobacco: Never    Tobacco comments:     Ddd   Vaping Use    Vaping status: Never Used   Substance Use Topics    Alcohol use: Never     Comment: less than 1 drink per day, has been drinking less than one year    Drug use: Never     Family History   Problem Relation Age of Onset    Arthritis Mother     Heart disease Mother     Diabetes Mother     Depression Mother     Hyperlipidemia Mother     Hypertension Mother     Arthritis Father     Drug abuse Father     Hyperlipidemia Father     Hypertension Father     Diabetes Maternal Grandmother     Diabetes Paternal Grandmother         Unspecified       Medications:  Prior to Admission medications    Medication Sig Start Date End Date Taking? Authorizing Provider   albuterol sulfate  (90 Base) MCG/ACT inhaler INHALE 2 PUFFS BY MOUTH FOUR TIMES DAILY 9/13/24  Yes Berta Tucker APRN   cetirizine (zyrTEC) 10 MG tablet Take 1 tablet by mouth Daily.  Patient taking differently: Take 1 tablet by mouth Daily. Using over the  "counter 10/11/23  Yes Berta Tucker APRN   fenofibrate (TRICOR) 145 MG tablet TAKE 1 TABLET BY MOUTH EVERY DAY 2/3/25  Yes Renetta Barrientos MD   meclizine (ANTIVERT) 25 MG tablet Take 1 tablet by mouth 3 (Three) Times a Day As Needed for Dizziness. 2/26/25  Yes Isabel Pimentel MD   methocarbamol (ROBAXIN) 750 MG tablet methocarbamol 750 mg oral tablet take 1 tablet (750 mg) by oral route 3 times per day   Active   Yes Aurora Bro MD   oxybutynin XL (DITROPAN XL) 15 MG 24 hr tablet TAKE 1 TABLET BY MOUTH EVERY DAY 1/2/25  Yes Berta Tucker APRN   oxyCODONE-acetaminophen (PERCOCET)  MG per tablet Take 1 tablet by mouth Every 8 (Eight) Hours As Needed for Moderate Pain.   Yes Aurora Bro MD   oxyCODONE-acetaminophen (PERCOCET) 7.5-325 MG per tablet Percocet 7.5-325 mg oral tablet take 1 tablet by oral route every 6 hours as needed   Active   Yes ProviderAurora MD   propranolol (INDERAL) 40 MG tablet TAKE 1 TABLET BY MOUTH TWICE A DAY 2/19/25  Yes Berta Tucker APRN   rizatriptan MLT (MAXALT-MLT) 10 MG disintegrating tablet Place 1 tablet on the tongue 1 (One) Time As Needed. 2/24/25  Yes ProviderAurora MD   azithromycin (Zithromax Z-Allen) 250 MG tablet Take 2 tablets by mouth on day 1, then 1 tablet daily on days 2-5 2/26/25   Isabel Pimentel MD        Allergies:   Penicillins and Adhesive tape    Health Maintenance Due   Topic Date Due    TDAP/TD VACCINES (1 - Tdap) Never done    PAP SMEAR  Never done    COVID-19 Vaccine (3 - 2024-25 season) 09/01/2024    MAMMOGRAM  10/28/2024         Vital Signs:   /82 (BP Location: Left arm, Patient Position: Sitting, Cuff Size: Adult)   Pulse 80   Temp 97.8 °F (36.6 °C) (Oral)   Ht 170.2 cm (67\")   Wt 99.1 kg (218 lb 6.4 oz)   SpO2 96%   BMI 34.21 kg/m²     Wt Readings from Last 3 Encounters:   03/04/25 99.1 kg (218 lb 6.4 oz)   02/25/25 98.7 kg (217 lb 9.5 oz)   02/24/25 98.4 kg (216 lb " 14.4 oz)     BP Readings from Last 3 Encounters:   03/04/25 122/82   02/26/25 150/89   02/24/25 139/87       BMI is >= 30 and <35. (Class 1 Obesity). The following options were offered after discussion;: exercise counseling/recommendations and nutrition counseling/recommendations       Physical Exam  Vitals reviewed.   Constitutional:       Appearance: Normal appearance. She is well-developed.   HENT:      Head: Normocephalic and atraumatic.   Eyes:      Conjunctiva/sclera: Conjunctivae normal.      Pupils: Pupils are equal, round, and reactive to light.   Cardiovascular:      Rate and Rhythm: Normal rate and regular rhythm.      Heart sounds: No murmur heard.     No friction rub. No gallop.   Pulmonary:      Effort: Pulmonary effort is normal.      Breath sounds: Normal breath sounds. No wheezing or rhonchi.   Abdominal:      General: Bowel sounds are normal. There is no distension.      Palpations: Abdomen is soft.      Tenderness: There is no abdominal tenderness.   Skin:     General: Skin is warm and dry.   Neurological:      Mental Status: She is alert and oriented to person, place, and time.      Cranial Nerves: No cranial nerve deficit.   Psychiatric:         Mood and Affect: Mood and affect normal.         Behavior: Behavior normal.         Thought Content: Thought content normal.         Judgment: Judgment normal.       Physical Exam  Lungs are clear.    Vital Signs  Blood pressure is 122/82.      Result Review :    The following data was reviewed by CONNOR Roman on 03/05/25 at 09:15 EST:    Common labs          2/25/2025    20:22 3/4/2025    12:23   Common Labs   Glucose 144  101    BUN 25  21    Creatinine 0.80  0.83    Sodium 137  142    Potassium 4.4  4.5    Chloride 102  107    Calcium 9.9  9.7    Albumin 4.6  4.1    Total Bilirubin 0.2  0.2    Alkaline Phosphatase 88  83    AST (SGOT) 22  39    ALT (SGPT) 14  24    WBC 8.75  11.86    Hemoglobin 14.0  13.8    Hematocrit 43.0  39.7     Platelets 332  325    Total Cholesterol  129    Triglycerides  140    HDL Cholesterol  33    LDL Cholesterol   71    Hemoglobin A1C  5.40        XR Chest 1 View    Result Date: 2/25/2025  Impression: No active disease is seen. Electronically Signed: Jose Eduardo Sher MD  2/25/2025 9:20 PM EST  Workstation ID: [id number]    XR Chest 2 View    Result Date: 2/24/2025  Impression: 1.No acute radiographic abnormality is identified. Electronically Signed: Charlie Treviño MD  2/24/2025 2:55 PM EST  Workstation ID: [id number]      Results                 Assessment and Plan    Diagnoses and all orders for this visit:    1. Annual physical exam (Primary)  -     CBC & Differential  -     Comprehensive Metabolic Panel  -     Lipid Panel  -     TSH Rfx On Abnormal To Free T4    2. Essential hypertension  -     CBC & Differential  -     Comprehensive Metabolic Panel    3. Mixed hyperlipidemia  -     Lipid Panel    4. OAB (overactive bladder)    5. Hypertonicity of bladder    6. Fibromyalgia    7. Elevated glucose  -     Hemoglobin A1c    8. Weight loss  -     TSH Rfx On Abnormal To Free T4  -     Hemoglobin A1c    9. IUD (intrauterine device) in place    10. Encounter for smoking cessation counseling  -     Varenicline Tartrate, Starter, 0.5 MG X 11 & 1 MG X 42 tablet therapy pack; Take 0.5 mg by mouth Daily for 3 days, THEN 0.5 mg 2 (Two) Times a Day for 4 days, THEN 1 mg 2 (Two) Times a Day for 21 days.  Dispense: 42 each; Refill: 0       Assessment & Plan  1. Sinus Infection.  The patient experienced dizziness and vertigo, which was later diagnosed as a sinus infection. She was treated with a Z-Allen and a prednisone pack. She also used over-the-counter Mucinex to help with chest congestion. She reports feeling better with no more dizziness, though she still has a slight cough and some mucus production.    2. Smoking Cessation.  The patient smokes about a pack a day and has tried patches and Wellbutrin in the past without success.  She was advised to set a target date for quitting and to actively reduce her smoking or quit cold turkey. A prescription for Chantix was provided, with instructions to take 0.5 mg once daily for the first 3 days, then 0.5 mg twice daily from days 4 through 7, and finally a maintenance dose of 1 mg twice daily. She was advised to stay on the maintenance dose for at least 3 months even after quitting smoking. She can use nicotine patches concurrently if needed.    3. Health Maintenance.  The patient has a history of HPV and precancerous cells on her cervix. She has not had a Pap smear since her IUD was placed nearly 4 years ago. Her blood pressure has improved since the last visit. A routine Pap smear will be conducted during her next visit. If the results are abnormal, a referral to gynecology will be made. Blood work will be repeated today to check cholesterol and thyroid levels.    4. Weight loss.  She has lost nearly 70 pounds, dropping from a size 20 to a size 12, without intentional dieting or exercise changes.    Patient will have her employer send over [facility] documentation for continuous leave from 2/17/2025 through 3/3/2025.  She returned to work on 3/3/2025.  This was related to recent respiratory illness. 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     Follow-up  The patient will follow up in 1 month for smoking cessation and breathing evaluation.          Smoking Cessation:    Debi Schroeder  reports that she has been smoking cigarettes. She started smoking about 30 years ago. She has a 30.2 pack-year smoking history. She has been exposed to tobacco smoke. She has never used smokeless tobacco. I have educated her on the risk of diseases from using tobacco products such as cancer, COPD, and heart disease.     I advised her  to quit and she is willing to quit. We have discussed the following method/s for tobacco cessation:  Counseling and Prescription Medication.  Together we have set a quit date for 1 month from today.  She will follow up with me in 1 month or sooner to check on her progress.    I spent 4 minutes counseling the patient.            Follow Up   Return in about 1 month (around 4/4/2025) for Next scheduled follow up.  Patient was given instructions and counseling regarding her condition or for health maintenance advice. Please see specific information pulled into the AVS if appropriate.     Please note that portions of this note were completed with a voice recognition program.    Patient or patient representative verbalized consent for the use of Ambient Listening during the visit with  CONNOR Roman for chart documentation. 3/5/2025  09:14 EST

## 2025-03-05 LAB
QT INTERVAL: 357 MS
QTC INTERVAL: 402 MS

## 2025-03-21 ENCOUNTER — TELEPHONE (OUTPATIENT)
Dept: FAMILY MEDICINE CLINIC | Facility: CLINIC | Age: 50
End: 2025-03-21
Payer: COMMERCIAL

## 2025-04-01 DIAGNOSIS — E78.2 MIXED HYPERLIPIDEMIA: ICD-10-CM

## 2025-04-01 DIAGNOSIS — N32.81 OAB (OVERACTIVE BLADDER): ICD-10-CM

## 2025-04-01 RX ORDER — OXYBUTYNIN CHLORIDE 15 MG/1
15 TABLET, EXTENDED RELEASE ORAL DAILY
Qty: 30 TABLET | Refills: 2 | Status: SHIPPED | OUTPATIENT
Start: 2025-04-01

## 2025-04-02 RX ORDER — FENOFIBRATE 145 MG/1
145 TABLET, COATED ORAL DAILY
Qty: 30 TABLET | Refills: 1 | Status: SHIPPED | OUTPATIENT
Start: 2025-04-02

## 2025-05-26 DIAGNOSIS — E78.2 MIXED HYPERLIPIDEMIA: ICD-10-CM

## 2025-05-27 RX ORDER — FENOFIBRATE 145 MG/1
145 TABLET, FILM COATED ORAL DAILY
Qty: 30 TABLET | Refills: 1 | Status: SHIPPED | OUTPATIENT
Start: 2025-05-27

## 2025-05-28 DIAGNOSIS — I10 ESSENTIAL HYPERTENSION: ICD-10-CM

## 2025-05-28 RX ORDER — PROPRANOLOL HYDROCHLORIDE 40 MG/1
40 TABLET ORAL 2 TIMES DAILY
Qty: 180 TABLET | Refills: 1 | Status: SHIPPED | OUTPATIENT
Start: 2025-05-28

## 2025-06-25 DIAGNOSIS — N32.81 OAB (OVERACTIVE BLADDER): ICD-10-CM

## 2025-06-25 RX ORDER — OXYBUTYNIN CHLORIDE 15 MG/1
15 TABLET, EXTENDED RELEASE ORAL DAILY
Qty: 30 TABLET | Refills: 2 | Status: SHIPPED | OUTPATIENT
Start: 2025-06-25

## 2025-07-22 DIAGNOSIS — E78.2 MIXED HYPERLIPIDEMIA: ICD-10-CM

## 2025-07-22 RX ORDER — FENOFIBRATE 145 MG/1
145 TABLET, FILM COATED ORAL DAILY
Qty: 30 TABLET | Refills: 1 | Status: SHIPPED | OUTPATIENT
Start: 2025-07-22